# Patient Record
Sex: FEMALE | Race: BLACK OR AFRICAN AMERICAN | NOT HISPANIC OR LATINO
[De-identification: names, ages, dates, MRNs, and addresses within clinical notes are randomized per-mention and may not be internally consistent; named-entity substitution may affect disease eponyms.]

---

## 2017-06-23 ENCOUNTER — RESULT REVIEW (OUTPATIENT)
Age: 22
End: 2017-06-23

## 2017-06-23 ENCOUNTER — INPATIENT (INPATIENT)
Facility: HOSPITAL | Age: 22
LOS: 2 days | Discharge: HOME | End: 2017-06-26
Attending: SURGERY | Admitting: SURGERY
Payer: COMMERCIAL

## 2017-06-23 ENCOUNTER — TRANSCRIPTION ENCOUNTER (OUTPATIENT)
Age: 22
End: 2017-06-23

## 2017-06-23 VITALS
RESPIRATION RATE: 18 BRPM | HEIGHT: 66 IN | WEIGHT: 154.98 LBS | TEMPERATURE: 98 F | HEART RATE: 82 BPM | SYSTOLIC BLOOD PRESSURE: 133 MMHG | OXYGEN SATURATION: 99 % | DIASTOLIC BLOOD PRESSURE: 70 MMHG

## 2017-06-23 DIAGNOSIS — K35.3 ACUTE APPENDICITIS WITH LOCALIZED PERITONITIS: ICD-10-CM

## 2017-06-23 LAB
ALBUMIN SERPL ELPH-MCNC: 4 G/DL — SIGNIFICANT CHANGE UP (ref 3.3–5)
ALP SERPL-CCNC: 45 U/L — SIGNIFICANT CHANGE UP (ref 40–120)
ALT FLD-CCNC: 18 U/L — SIGNIFICANT CHANGE UP (ref 12–78)
ANION GAP SERPL CALC-SCNC: 7 MMOL/L — SIGNIFICANT CHANGE UP (ref 5–17)
APPEARANCE UR: CLEAR — SIGNIFICANT CHANGE UP
APTT BLD: 30.1 SEC — SIGNIFICANT CHANGE UP (ref 27.5–37.4)
AST SERPL-CCNC: 11 U/L — LOW (ref 15–37)
BACTERIA # UR AUTO: ABNORMAL
BASOPHILS # BLD AUTO: 0.1 K/UL — SIGNIFICANT CHANGE UP (ref 0–0.2)
BASOPHILS NFR BLD AUTO: 0.9 % — SIGNIFICANT CHANGE UP (ref 0–2)
BILIRUB SERPL-MCNC: 0.5 MG/DL — SIGNIFICANT CHANGE UP (ref 0.2–1.2)
BILIRUB UR-MCNC: NEGATIVE — SIGNIFICANT CHANGE UP
BLD GP AB SCN SERPL QL: SIGNIFICANT CHANGE UP
BUN SERPL-MCNC: 10 MG/DL — SIGNIFICANT CHANGE UP (ref 7–23)
CALCIUM SERPL-MCNC: 8.6 MG/DL — SIGNIFICANT CHANGE UP (ref 8.5–10.1)
CHLORIDE SERPL-SCNC: 108 MMOL/L — SIGNIFICANT CHANGE UP (ref 96–108)
CO2 SERPL-SCNC: 26 MMOL/L — SIGNIFICANT CHANGE UP (ref 22–31)
COLOR SPEC: YELLOW — SIGNIFICANT CHANGE UP
CREAT SERPL-MCNC: 0.85 MG/DL — SIGNIFICANT CHANGE UP (ref 0.5–1.3)
DIFF PNL FLD: ABNORMAL
EOSINOPHIL # BLD AUTO: 0.1 K/UL — SIGNIFICANT CHANGE UP (ref 0–0.5)
EOSINOPHIL NFR BLD AUTO: 1 % — SIGNIFICANT CHANGE UP (ref 0–6)
EPI CELLS # UR: SIGNIFICANT CHANGE UP
GLUCOSE SERPL-MCNC: 89 MG/DL — SIGNIFICANT CHANGE UP (ref 70–99)
GLUCOSE UR QL: NEGATIVE MG/DL — SIGNIFICANT CHANGE UP
HCG SERPL-ACNC: <1 MIU/ML — SIGNIFICANT CHANGE UP
HCT VFR BLD CALC: 35.6 % — SIGNIFICANT CHANGE UP (ref 34.5–45)
HGB BLD-MCNC: 11.9 G/DL — SIGNIFICANT CHANGE UP (ref 11.5–15.5)
INR BLD: 1.02 RATIO — SIGNIFICANT CHANGE UP (ref 0.88–1.16)
KETONES UR-MCNC: NEGATIVE — SIGNIFICANT CHANGE UP
LACTATE SERPL-SCNC: 1 MMOL/L — SIGNIFICANT CHANGE UP (ref 0.7–2)
LEUKOCYTE ESTERASE UR-ACNC: NEGATIVE — SIGNIFICANT CHANGE UP
LIDOCAIN IGE QN: 98 U/L — SIGNIFICANT CHANGE UP (ref 73–393)
LYMPHOCYTES # BLD AUTO: 1.5 K/UL — SIGNIFICANT CHANGE UP (ref 1–3.3)
LYMPHOCYTES # BLD AUTO: 14.3 % — SIGNIFICANT CHANGE UP (ref 13–44)
MCHC RBC-ENTMCNC: 25.4 PG — LOW (ref 27–34)
MCHC RBC-ENTMCNC: 33.5 GM/DL — SIGNIFICANT CHANGE UP (ref 32–36)
MCV RBC AUTO: 75.7 FL — LOW (ref 80–100)
MONOCYTES # BLD AUTO: 1.1 K/UL — HIGH (ref 0–0.9)
MONOCYTES NFR BLD AUTO: 11.2 % — SIGNIFICANT CHANGE UP (ref 2–14)
NEUTROPHILS # BLD AUTO: 7.5 K/UL — HIGH (ref 1.8–7.4)
NEUTROPHILS NFR BLD AUTO: 72.6 % — SIGNIFICANT CHANGE UP (ref 43–77)
NITRITE UR-MCNC: NEGATIVE — SIGNIFICANT CHANGE UP
PH UR: 7 — SIGNIFICANT CHANGE UP (ref 5–8)
PLATELET # BLD AUTO: 249 K/UL — SIGNIFICANT CHANGE UP (ref 150–400)
POTASSIUM SERPL-MCNC: 3.8 MMOL/L — SIGNIFICANT CHANGE UP (ref 3.5–5.3)
POTASSIUM SERPL-SCNC: 3.8 MMOL/L — SIGNIFICANT CHANGE UP (ref 3.5–5.3)
PROT SERPL-MCNC: 7.6 GM/DL — SIGNIFICANT CHANGE UP (ref 6–8.3)
PROT UR-MCNC: NEGATIVE MG/DL — SIGNIFICANT CHANGE UP
PROTHROM AB SERPL-ACNC: 11.1 SEC — SIGNIFICANT CHANGE UP (ref 9.8–12.7)
RBC # BLD: 4.7 M/UL — SIGNIFICANT CHANGE UP (ref 3.8–5.2)
RBC # FLD: 16 % — HIGH (ref 11–15)
RBC CASTS # UR COMP ASSIST: ABNORMAL /HPF (ref 0–4)
SODIUM SERPL-SCNC: 141 MMOL/L — SIGNIFICANT CHANGE UP (ref 135–145)
SP GR SPEC: 1.01 — SIGNIFICANT CHANGE UP (ref 1.01–1.02)
UROBILINOGEN FLD QL: NEGATIVE MG/DL — SIGNIFICANT CHANGE UP
WBC # BLD: 10.3 K/UL — SIGNIFICANT CHANGE UP (ref 3.8–10.5)
WBC # FLD AUTO: 10.3 K/UL — SIGNIFICANT CHANGE UP (ref 3.8–10.5)
WBC UR QL: SIGNIFICANT CHANGE UP

## 2017-06-23 PROCEDURE — 76856 US EXAM PELVIC COMPLETE: CPT | Mod: 26

## 2017-06-23 PROCEDURE — 74177 CT ABD & PELVIS W/CONTRAST: CPT | Mod: 26

## 2017-06-23 PROCEDURE — 88304 TISSUE EXAM BY PATHOLOGIST: CPT | Mod: 26

## 2017-06-23 PROCEDURE — 99285 EMERGENCY DEPT VISIT HI MDM: CPT | Mod: 25

## 2017-06-23 PROCEDURE — 44970 LAPAROSCOPY APPENDECTOMY: CPT | Mod: AS

## 2017-06-23 PROCEDURE — 71010: CPT | Mod: 26

## 2017-06-23 RX ORDER — SODIUM CHLORIDE 9 MG/ML
1000 INJECTION, SOLUTION INTRAVENOUS
Qty: 0 | Refills: 0 | Status: DISCONTINUED | OUTPATIENT
Start: 2017-06-23 | End: 2017-06-25

## 2017-06-23 RX ORDER — ONDANSETRON 8 MG/1
4 TABLET, FILM COATED ORAL ONCE
Qty: 0 | Refills: 0 | Status: COMPLETED | OUTPATIENT
Start: 2017-06-23 | End: 2017-06-23

## 2017-06-23 RX ORDER — FENTANYL CITRATE 50 UG/ML
50 INJECTION INTRAVENOUS
Qty: 0 | Refills: 0 | Status: DISCONTINUED | OUTPATIENT
Start: 2017-06-23 | End: 2017-06-23

## 2017-06-23 RX ORDER — SODIUM CHLORIDE 9 MG/ML
1000 INJECTION, SOLUTION INTRAVENOUS
Qty: 0 | Refills: 0 | Status: DISCONTINUED | OUTPATIENT
Start: 2017-06-23 | End: 2017-06-23

## 2017-06-23 RX ORDER — ACETAMINOPHEN 500 MG
1000 TABLET ORAL ONCE
Qty: 0 | Refills: 0 | Status: COMPLETED | OUTPATIENT
Start: 2017-06-23 | End: 2017-06-23

## 2017-06-23 RX ORDER — PIPERACILLIN AND TAZOBACTAM 4; .5 G/20ML; G/20ML
3.38 INJECTION, POWDER, LYOPHILIZED, FOR SOLUTION INTRAVENOUS ONCE
Qty: 0 | Refills: 0 | Status: COMPLETED | OUTPATIENT
Start: 2017-06-23 | End: 2017-06-23

## 2017-06-23 RX ORDER — PIPERACILLIN AND TAZOBACTAM 4; .5 G/20ML; G/20ML
3.38 INJECTION, POWDER, LYOPHILIZED, FOR SOLUTION INTRAVENOUS EVERY 8 HOURS
Qty: 0 | Refills: 0 | Status: DISCONTINUED | OUTPATIENT
Start: 2017-06-23 | End: 2017-06-23

## 2017-06-23 RX ORDER — HYDROMORPHONE HYDROCHLORIDE 2 MG/ML
1 INJECTION INTRAMUSCULAR; INTRAVENOUS; SUBCUTANEOUS EVERY 4 HOURS
Qty: 0 | Refills: 0 | Status: DISCONTINUED | OUTPATIENT
Start: 2017-06-23 | End: 2017-06-24

## 2017-06-23 RX ORDER — IOHEXOL 300 MG/ML
30 INJECTION, SOLUTION INTRAVENOUS ONCE
Qty: 0 | Refills: 0 | Status: COMPLETED | OUTPATIENT
Start: 2017-06-23 | End: 2017-06-23

## 2017-06-23 RX ORDER — HYDROMORPHONE HYDROCHLORIDE 2 MG/ML
1 INJECTION INTRAMUSCULAR; INTRAVENOUS; SUBCUTANEOUS
Qty: 0 | Refills: 0 | Status: DISCONTINUED | OUTPATIENT
Start: 2017-06-23 | End: 2017-06-23

## 2017-06-23 RX ORDER — SODIUM CHLORIDE 9 MG/ML
2000 INJECTION INTRAMUSCULAR; INTRAVENOUS; SUBCUTANEOUS ONCE
Qty: 0 | Refills: 0 | Status: COMPLETED | OUTPATIENT
Start: 2017-06-23 | End: 2017-06-23

## 2017-06-23 RX ORDER — SODIUM CHLORIDE 9 MG/ML
3 INJECTION INTRAMUSCULAR; INTRAVENOUS; SUBCUTANEOUS ONCE
Qty: 0 | Refills: 0 | Status: COMPLETED | OUTPATIENT
Start: 2017-06-23 | End: 2017-06-23

## 2017-06-23 RX ORDER — MORPHINE SULFATE 50 MG/1
4 CAPSULE, EXTENDED RELEASE ORAL ONCE
Qty: 0 | Refills: 0 | Status: DISCONTINUED | OUTPATIENT
Start: 2017-06-23 | End: 2017-06-23

## 2017-06-23 RX ORDER — KETOROLAC TROMETHAMINE 30 MG/ML
30 SYRINGE (ML) INJECTION ONCE
Qty: 0 | Refills: 0 | Status: DISCONTINUED | OUTPATIENT
Start: 2017-06-23 | End: 2017-06-23

## 2017-06-23 RX ORDER — MORPHINE SULFATE 50 MG/1
4 CAPSULE, EXTENDED RELEASE ORAL EVERY 4 HOURS
Qty: 0 | Refills: 0 | Status: DISCONTINUED | OUTPATIENT
Start: 2017-06-23 | End: 2017-06-23

## 2017-06-23 RX ORDER — SODIUM CHLORIDE 9 MG/ML
1000 INJECTION INTRAMUSCULAR; INTRAVENOUS; SUBCUTANEOUS ONCE
Qty: 0 | Refills: 0 | Status: COMPLETED | OUTPATIENT
Start: 2017-06-23 | End: 2017-06-23

## 2017-06-23 RX ORDER — PANTOPRAZOLE SODIUM 20 MG/1
40 TABLET, DELAYED RELEASE ORAL ONCE
Qty: 0 | Refills: 0 | Status: COMPLETED | OUTPATIENT
Start: 2017-06-23 | End: 2017-06-23

## 2017-06-23 RX ORDER — ONDANSETRON 8 MG/1
8 TABLET, FILM COATED ORAL EVERY 6 HOURS
Qty: 0 | Refills: 0 | Status: DISCONTINUED | OUTPATIENT
Start: 2017-06-23 | End: 2017-06-26

## 2017-06-23 RX ORDER — DOCUSATE SODIUM 100 MG
100 CAPSULE ORAL
Qty: 0 | Refills: 0 | Status: DISCONTINUED | OUTPATIENT
Start: 2017-06-23 | End: 2017-06-26

## 2017-06-23 RX ORDER — HEPARIN SODIUM 5000 [USP'U]/ML
5000 INJECTION INTRAVENOUS; SUBCUTANEOUS EVERY 12 HOURS
Qty: 0 | Refills: 0 | Status: DISCONTINUED | OUTPATIENT
Start: 2017-06-23 | End: 2017-06-26

## 2017-06-23 RX ORDER — HEPARIN SODIUM 5000 [USP'U]/ML
5000 INJECTION INTRAVENOUS; SUBCUTANEOUS EVERY 12 HOURS
Qty: 0 | Refills: 0 | Status: DISCONTINUED | OUTPATIENT
Start: 2017-06-23 | End: 2017-06-23

## 2017-06-23 RX ORDER — ONDANSETRON 8 MG/1
4 TABLET, FILM COATED ORAL EVERY 6 HOURS
Qty: 0 | Refills: 0 | Status: DISCONTINUED | OUTPATIENT
Start: 2017-06-23 | End: 2017-06-23

## 2017-06-23 RX ADMIN — HYDROMORPHONE HYDROCHLORIDE 1 MILLIGRAM(S): 2 INJECTION INTRAMUSCULAR; INTRAVENOUS; SUBCUTANEOUS at 23:25

## 2017-06-23 RX ADMIN — PANTOPRAZOLE SODIUM 40 MILLIGRAM(S): 20 TABLET, DELAYED RELEASE ORAL at 07:04

## 2017-06-23 RX ADMIN — SODIUM CHLORIDE 125 MILLILITER(S): 9 INJECTION, SOLUTION INTRAVENOUS at 20:03

## 2017-06-23 RX ADMIN — SODIUM CHLORIDE 3 MILLILITER(S): 9 INJECTION INTRAMUSCULAR; INTRAVENOUS; SUBCUTANEOUS at 06:49

## 2017-06-23 RX ADMIN — SODIUM CHLORIDE 1000 MILLILITER(S): 9 INJECTION INTRAMUSCULAR; INTRAVENOUS; SUBCUTANEOUS at 12:41

## 2017-06-23 RX ADMIN — IOHEXOL 30 MILLILITER(S): 300 INJECTION, SOLUTION INTRAVENOUS at 07:04

## 2017-06-23 RX ADMIN — PIPERACILLIN AND TAZOBACTAM 200 GRAM(S): 4; .5 INJECTION, POWDER, LYOPHILIZED, FOR SOLUTION INTRAVENOUS at 12:38

## 2017-06-23 RX ADMIN — Medication 30 MILLIGRAM(S): at 10:17

## 2017-06-23 RX ADMIN — HYDROMORPHONE HYDROCHLORIDE 1 MILLIGRAM(S): 2 INJECTION INTRAMUSCULAR; INTRAVENOUS; SUBCUTANEOUS at 23:10

## 2017-06-23 RX ADMIN — Medication 1000 MILLIGRAM(S): at 18:13

## 2017-06-23 RX ADMIN — SODIUM CHLORIDE 2000 MILLILITER(S): 9 INJECTION INTRAMUSCULAR; INTRAVENOUS; SUBCUTANEOUS at 06:49

## 2017-06-23 RX ADMIN — Medication 400 MILLIGRAM(S): at 17:25

## 2017-06-23 RX ADMIN — MORPHINE SULFATE 4 MILLIGRAM(S): 50 CAPSULE, EXTENDED RELEASE ORAL at 07:41

## 2017-06-23 RX ADMIN — ONDANSETRON 4 MILLIGRAM(S): 8 TABLET, FILM COATED ORAL at 07:04

## 2017-06-23 RX ADMIN — ONDANSETRON 8 MILLIGRAM(S): 8 TABLET, FILM COATED ORAL at 19:52

## 2017-06-23 RX ADMIN — MORPHINE SULFATE 4 MILLIGRAM(S): 50 CAPSULE, EXTENDED RELEASE ORAL at 08:53

## 2017-06-23 RX ADMIN — SODIUM CHLORIDE 100 MILLILITER(S): 9 INJECTION, SOLUTION INTRAVENOUS at 17:05

## 2017-06-23 RX ADMIN — Medication 30 MILLIGRAM(S): at 11:17

## 2017-06-23 NOTE — ED PROVIDER NOTE - PROGRESS NOTE DETAILS
surgery is paged Pt sts she had right lower abd pain since 19:00 pm yesterday. Dr. Moon surgeon is here eval and admit pt.

## 2017-06-23 NOTE — ED PROVIDER NOTE - PHYSICAL EXAMINATION
General:     NAD, well-nourished, well-appearing  Head:     NC/AT, EOMI, oral mucosa moist  Neck:     trachea midline  Lungs:     CTA b/l, no w/r/r  CVS:     S1S2, RRR, no m/g/r  Abd:     +BS, ttp @ RLQ, +Rovsing, +rebound, s/nd, no organomegaly  Ext:    2+ radial and pedal pulses, no c/c/e  Neuro: AAOx3, no sensory/motor deficits

## 2017-06-23 NOTE — H&P ADULT - NSHPPHYSICALEXAM_GEN_ALL_CORE
PHYSICAL EXAM:  GENERAL: NAD, well-groomed, well-developed  HEAD:  Atraumatic, Normocephalic  EYES: EOMI, PERRLA, conjunctiva and sclera clear  ENMT: No tonsillar erythema, exudates, or enlargement; Moist mucous membranes,   NECK: Supple, No JVD, Normal thyroid  HEART: Regular rate and rhythm; No murmurs, rubs, or gallops  RESPIRATORY: CTA B/L, No W/R/R  ABDOMEN: Soft, Nondistended; Bowel sounds present, +Mcburney point tenderness, +rebound, +rovsing, mild guarding  NEUROLOGY: A&Ox3, nonfocal, CN II-XII grossly intact, sensation intact, no gait abnormalities bilaterally;  EXTREMITIES:  2+ Peripheral Pulses, No clubbing, cyanosis, or edema  SKIN: warm, dry, normal color, no rash or abnormal lesions

## 2017-06-23 NOTE — H&P ADULT - NSHPREVIEWOFSYSTEMS_GEN_ALL_CORE
REVIEW OF SYSTEM:  CONSTITUTIONAL: No weakness, fevers or chills  EYES/ENT: No visual changes;  No vertigo or throat pain   NECK: No pain or stiffness  RESPIRATORY: No cough, wheezing, hemoptysis; No shortness of breath  CARDIOVASCULAR: No chest pain or palpitations  GASTROINTESTINAL:  +abdominal pain, +N/V  No hematemesis; No diarrhea or constipation. No melena or hematochezia.  GENITOURINARY: No dysuria, frequency or hematuria  NEUROLOGICAL: No numbness or weakness  MUSCULOSKELETAL: +left leg and ankle pain  SKIN: No itching, burning, rashes, or lesions   All other review of systems is negative unless indicated above.

## 2017-06-23 NOTE — H&P ADULT - ATTENDING COMMENTS
I have seen and examined the patient.  I have reviewed the CT images and all lab work.  I have discussed the need for an appendectomy with the patient and her mother.  I have gone over the procedure of a laparoscopic appendectomy and possible open appendectomy with the patient and her mother.  The patient has signed the consent for surgery.

## 2017-06-23 NOTE — H&P ADULT - HISTORY OF PRESENT ILLNESS
21y/o female with no significant PMH present with c/o RLQ pain. Pt states that she started her menses on Wednesday and had cramps, however she began to have severe sharp RLQ pain yesterday evening, non-radiating Pain was associated with nausea, vomiting X 3 episodes and loss of appetite. Denies fever/chills, denies diarrhea, dysuria or frequency. Denies CP/SOB. Last meal was 6pm yesterday, last BM was yesterday morning, denies any change in bowel habits.

## 2017-06-23 NOTE — ED PROVIDER NOTE - SHIFT CHANGE DETAILS
ct, us, dispo  Patient signed out to incoming physician.  All decisions regarding the progression of care will be made at their discretion.

## 2017-06-23 NOTE — H&P ADULT - NSHPSOCIALHISTORY_GEN_ALL_CORE
drinks socially, nonsmoker, denies any other recreational drugs, lives with mother, works at [employer].

## 2017-06-23 NOTE — PROGRESS NOTE ADULT - SUBJECTIVE AND OBJECTIVE BOX
Post-op Check~ S/P Laparoscopic appendectomy POD# 0    22y Female seen and examined at bedside.  She c/o nausea and while seeing the pt, she vomited approximately 50ml of water/mucous.  After emesis pt stated she felt better.    Vital Signs Last 24 Hrs  T(F): 97.5, Max: 98.8 (06-23 @ 16:43)  HR: 72  BP: 105/69  RR: 16  SpO2: 97%  Wt(kg): --   CAPILLARY BLOOD GLUCOSE    I&O's Detail    I & Os for current day (as of 23 Jun 2017 23:43)  =============================================  IN:    lactated ringers.: 225 ml    Total IN: 225 ml  ---------------------------------------------  OUT:    Total OUT: 0 ml  ---------------------------------------------  Total NET: 225 ml      GEN:  NAD, appears uncomfortable   RESP: No SOB, respirations nonlabored, CTAB  CV:  RRR S1 S2, no tachycardia  ABD: soft, nondistended, with expected surgical site tenderness, no guarding or rigidity, steri-strips intact without evidence of citlali bleeding   EXT:  warm well perfused, no calf tenderness, no edema.     A/P 22yFemale S/P Laparoscopic appendectomy POD#0, with nausea and emesis x once.    -anti-emetics  -pain control prn  -f/u AM labs, replete prn  -DVT prophylaxis   -encourage I/S, education provided  -OOB

## 2017-06-23 NOTE — ED PROVIDER NOTE - OBJECTIVE STATEMENT
22yoF; with no signif pmh; now p/w abd pain--epigastric, radiating to rlq, x1 day, progressively worsening, associated with n/v. c/o chills. denies fever. denies sick contacts. denies travel. denies unusual PO intake.

## 2017-06-23 NOTE — H&P ADULT - NSHPLABSRESULTS_GEN_ALL_CORE
06-23    141  |  108  |  10  ----------------------------<  89  3.8   |  26  |  0.85    Ca    8.6      23 Jun 2017 06:54    TPro  7.6  /  Alb  4.0  /  TBili  0.5  /  DBili  x   /  AST  11<L>  /  ALT  18  /  AlkPhos  45  06-23                        11.9   10.3  )-----------( 249      ( 23 Jun 2017 06:54 )             35.6  Prothrombin Time and INR, Plasma (06.23.17 @ 06:54)    Prothrombin Time, Plasma: 11.1: Effective March 21st, the reference range for PT has changed. sec    INR: 1.02: Please note: New Critical Value: 5.0 ratio as of 1/2/14. ratio    CXR:  FINDINGS:  No previous examinations are available for review.    The lungs are clear.  No pleural abnormality is seen.      The heart and mediastinum appear intact.           IMPRESSION: No evidence of active chest disease.            EXAM:  CT ABDOMEN AND PELVIS OC IC                            PROCEDURE DATE:  06/23/2017        INTERPRETATION:  CLINICAL INFORMATION:  Right lower quadrant abdominal   pain    COMPARISON: 12/27/2015    PROCEDURE:     Serial axial sections throughthe abdomen and pelvis are obtained with   the use of oral and intravenous contrast from the diaphragms to the   symphysis pubis.  3-D coronal and sagittal reformations were then created   from the axial images.    100 mls of Omnipaque 350 was administered intravenously without   complication and 0 mls were discarded.    FINDINGS:    LOWER CHEST: Within normal limits.    ABDOMEN:  LIVER: 9 mm indeterminate hypodense right hepatic lesion, stable in size.  BILE DUCTS: Normal caliber.  GALLBLADDER: No calcified gallstones. Normal caliber wall.  PANCREAS: Within normal limits.  SPLEEN: Within normal limits. 1.1 cm splenule.  ADRENALS: Within normal limits.  KIDNEYS/URETERS: Within normal limits. No hydronephrosis.    PELVIS: Small amount of pelvic fluid.  REPRODUCTIVE ORGANS: The uterus and adnexa are within normal limits.  URINARY BLADDER: Within normal limits.    BOWEL: Normal caliber.  APPENDIX: Mid and distal appendix is fluid-filled and dilated up to 1.1   cm. Periappendiceal inflammatory changes and fluid.  PERITONEUM: Mildly enlarged mesenteric lymph nodes. No free air. No fluid   collection.  VESSELS: Within normal limits.   RETROPERITONEUM: Within normal limits.  ABDOMINAL WALL: Fat-containing umbilical hernia.  BONES: Within normal limits.    IMPRESSION:      Acute appendicitis.  Mild mesenteric adenopathy.

## 2017-06-24 LAB
ANION GAP SERPL CALC-SCNC: 6 MMOL/L — SIGNIFICANT CHANGE UP (ref 5–17)
BUN SERPL-MCNC: 6 MG/DL — LOW (ref 7–23)
CALCIUM SERPL-MCNC: 7.9 MG/DL — LOW (ref 8.5–10.1)
CHLORIDE SERPL-SCNC: 107 MMOL/L — SIGNIFICANT CHANGE UP (ref 96–108)
CO2 SERPL-SCNC: 28 MMOL/L — SIGNIFICANT CHANGE UP (ref 22–31)
CREAT SERPL-MCNC: 0.96 MG/DL — SIGNIFICANT CHANGE UP (ref 0.5–1.3)
GLUCOSE SERPL-MCNC: 83 MG/DL — SIGNIFICANT CHANGE UP (ref 70–99)
HCT VFR BLD CALC: 32.3 % — LOW (ref 34.5–45)
HGB BLD-MCNC: 10.9 G/DL — LOW (ref 11.5–15.5)
MCHC RBC-ENTMCNC: 26 PG — LOW (ref 27–34)
MCHC RBC-ENTMCNC: 33.6 GM/DL — SIGNIFICANT CHANGE UP (ref 32–36)
MCV RBC AUTO: 77.2 FL — LOW (ref 80–100)
PLATELET # BLD AUTO: 195 K/UL — SIGNIFICANT CHANGE UP (ref 150–400)
POTASSIUM SERPL-MCNC: 3.6 MMOL/L — SIGNIFICANT CHANGE UP (ref 3.5–5.3)
POTASSIUM SERPL-SCNC: 3.6 MMOL/L — SIGNIFICANT CHANGE UP (ref 3.5–5.3)
RBC # BLD: 4.19 M/UL — SIGNIFICANT CHANGE UP (ref 3.8–5.2)
RBC # FLD: 15.7 % — HIGH (ref 11–15)
SODIUM SERPL-SCNC: 141 MMOL/L — SIGNIFICANT CHANGE UP (ref 135–145)
WBC # BLD: 5.9 K/UL — SIGNIFICANT CHANGE UP (ref 3.8–10.5)
WBC # FLD AUTO: 5.9 K/UL — SIGNIFICANT CHANGE UP (ref 3.8–10.5)

## 2017-06-24 RX ORDER — ACETAMINOPHEN 500 MG
1000 TABLET ORAL ONCE
Qty: 0 | Refills: 0 | Status: COMPLETED | OUTPATIENT
Start: 2017-06-24 | End: 2017-06-24

## 2017-06-24 RX ORDER — OXYCODONE HYDROCHLORIDE 5 MG/1
2 TABLET ORAL
Qty: 24 | Refills: 0
Start: 2017-06-24

## 2017-06-24 RX ORDER — PANTOPRAZOLE SODIUM 20 MG/1
40 TABLET, DELAYED RELEASE ORAL ONCE
Qty: 0 | Refills: 0 | Status: COMPLETED | OUTPATIENT
Start: 2017-06-24 | End: 2017-06-24

## 2017-06-24 RX ADMIN — HEPARIN SODIUM 5000 UNIT(S): 5000 INJECTION INTRAVENOUS; SUBCUTANEOUS at 05:42

## 2017-06-24 RX ADMIN — Medication 400 MILLIGRAM(S): at 19:08

## 2017-06-24 RX ADMIN — Medication 202 MILLIGRAM(S): at 00:26

## 2017-06-24 RX ADMIN — Medication 100 MILLIGRAM(S): at 17:34

## 2017-06-24 RX ADMIN — PANTOPRAZOLE SODIUM 40 MILLIGRAM(S): 20 TABLET, DELAYED RELEASE ORAL at 14:32

## 2017-06-24 RX ADMIN — Medication 100 MILLIGRAM(S): at 05:42

## 2017-06-24 RX ADMIN — HEPARIN SODIUM 5000 UNIT(S): 5000 INJECTION INTRAVENOUS; SUBCUTANEOUS at 17:34

## 2017-06-24 NOTE — DISCHARGE NOTE ADULT - HOSPITAL COURSE
Pt presented to ER with abdominal pain and found to have acute appendicitis. Pt was taken to OR for Laparoscopic Appendectomy. POD#1 pt is tolerating regular diet, abdominal pain is adequately controlled with pain meds, voids and ambulate without issues. Pt is stable for discharge with out patient follow-up. Pt presented to ER with abdominal pain and found to have acute appendicitis. Pt was taken to OR for Laparoscopic Appendectomy. POD#1 pt is tolerating regular diet, abdominal pain is adequately controlled with pain meds, voids and ambulate without issues. The Pt was not discharged because of a low grade fever On POD # 1&2.. On POD #3, she was afebrile & cleared for discharge..    To f/u in office next week.

## 2017-06-24 NOTE — DISCHARGE NOTE ADULT - MEDICATION SUMMARY - MEDICATIONS TO TAKE
I will START or STAY ON the medications listed below when I get home from the hospital:    acetaminophen-oxycodone 325 mg-5 mg oral tablet  -- 2 tab(s) by mouth every 6 hours, As Needed, Severe Pain (7 - 10) MDD:8  -- Indication: For ACUTE APPENENDICITIS I will START or STAY ON the medications listed below when I get home from the hospital:    acetaminophen-oxycodone 325 mg-5 mg oral tablet  -- 2 tab(s) by mouth every 6 hours, As Needed, Severe Pain (7 - 10) MDD:8  -- Indication: For moderate pain    Motrin  mg oral capsule  -- 2 cap(s) by mouth every 6 hours, As Needed - for mild pain; take with food  -- Indication: For mild pain    docusate sodium 100 mg oral capsule  -- 1 cap(s) by mouth 2 times a day  -- Indication: For stool softener

## 2017-06-24 NOTE — DISCHARGE NOTE ADULT - CARE PLAN
Principal Discharge DX:	Acute appendicitis with localized peritonitis  Goal:	Full recovery, tolerate regular diet, pain control  Instructions for follow-up, activity and diet:	Please no heavy lifting  bending twisting pushing or  pulling , may take shower, allow water to run over incision site, do not rub or peel tape-like strips, they will fall off on their own, no work or driving until follow-up appt. NO antiinflammatory medications(like aspirin/Advil/naprosyn).Take OTC stool softeners as needed for constipation. Diet as tolerated. Call the doctor if any questions or concerns.

## 2017-06-24 NOTE — DISCHARGE NOTE ADULT - PLAN OF CARE
Full recovery, tolerate regular diet, pain control Please no heavy lifting  bending twisting pushing or  pulling , may take shower, allow water to run over incision site, do not rub or peel tape-like strips, they will fall off on their own, no work or driving until follow-up appt. NO antiinflammatory medications(like aspirin/Advil/naprosyn).Take OTC stool softeners as needed for constipation. Diet as tolerated. Call the doctor if any questions or concerns.

## 2017-06-24 NOTE — DISCHARGE NOTE ADULT - CARE PROVIDER_API CALL
Vidal Mcdonald), Surgery  [address] [place] [address]  Phone: [phone]  Fax: [phone]

## 2017-06-24 NOTE — PROGRESS NOTE ADULT - SUBJECTIVE AND OBJECTIVE BOX
Patient seen/examined.  Agree w above note and plan and have discussed plan w house staff. C/o pain.   Home later if improved    Ted De Guzman MD

## 2017-06-24 NOTE — CHART NOTE - NSCHARTNOTEFT_GEN_A_CORE
Called by RN, pt was for discharge but now has a temperature of 101. D/C cancelled, Tylenol ordered and pt instructed to use incentive spirometry and to ambulate.

## 2017-06-24 NOTE — PROGRESS NOTE ADULT - SUBJECTIVE AND OBJECTIVE BOX
Postoperative Day #:1  Patient seen and examined bedside resting comfortably, had episode of nausea and vomiting last night after pain meds were taken.  Abdominal pain is well controlled.Tolerating diet.  Denies chest pain, dyspnea, cough. Voids    T(F): 98, Max: 98.8 (06-23 @ 16:43)  HR: 74 (62 - 86)  BP: 108/60 (100/67 - 119/70)  RR: 16 (11 - 17)  SpO2: 98% (97% - 100%)  Wt(kg): --  CAPILLARY BLOOD GLUCOSE      PHYSICAL EXAM:  General: NAD, WDWN  Neuro:  Alert & oriented x 3  HEENT: NCAT, EOMI, conjunctiva clear  CV: +S1+S2 regular rate and rhythm  Lung: clear to ausculation bilaterally, respirations nonlabored, good inspiratory effort  Abdomen: soft, ND. Normoactive BS, incision with steri strips c/d/i, no active bleeding, +incisional TTP  Extremities: no pedal edema or calf tenderness noted     LABS:                        11.9   10.3  )-----------( 249      ( 23 Jun 2017 06:54 )             35.6     06-23    141  |  108  |  10  ----------------------------<  89  3.8   |  26  |  0.85    Ca    8.6      23 Jun 2017 06:54    TPro  7.6  /  Alb  4.0  /  TBili  0.5  /  DBili  x   /  AST  11<L>  /  ALT  18  /  AlkPhos  45  06-23    PT/INR - ( 23 Jun 2017 06:54 )   PT: 11.1 sec;   INR: 1.02 ratio         PTT - ( 23 Jun 2017 06:54 )  PTT:30.1 sec  I&O's Detail    I & Os for current day (as of 24 Jun 2017 08:49)  =============================================  IN:    lactated ringers.: 1200 ml    lactated ringers.: 225 ml    Solution: 50 ml    Total IN: 1475 ml  ---------------------------------------------  OUT:    Voided: 300 ml    Total OUT: 300 ml  ---------------------------------------------  Total NET: 1175 ml        Impression: 22y Female S/P Lap Appendectomy-POD#1        Plan:  -continue VTE prophylaxis  -Pain management   -Increase activity with PT, OOB, Ambulate  -educated on proper incentive spirometry use  -Discharge home today  -will discuss with surgical attending

## 2017-06-24 NOTE — DISCHARGE NOTE ADULT - PATIENT PORTAL LINK FT
“You can access the [facility] Patient Portal, offered by [facility], by registering with the following website: [email or web address]”

## 2017-06-25 LAB
BASOPHILS # BLD AUTO: 0 K/UL — SIGNIFICANT CHANGE UP (ref 0–0.2)
BASOPHILS NFR BLD AUTO: 1 % — SIGNIFICANT CHANGE UP (ref 0–2)
EOSINOPHIL # BLD AUTO: 0.1 K/UL — SIGNIFICANT CHANGE UP (ref 0–0.5)
EOSINOPHIL NFR BLD AUTO: 1.5 % — SIGNIFICANT CHANGE UP (ref 0–6)
HCT VFR BLD CALC: 30.3 % — LOW (ref 34.5–45)
HGB BLD-MCNC: 10.3 G/DL — LOW (ref 11.5–15.5)
LYMPHOCYTES # BLD AUTO: 1.6 K/UL — SIGNIFICANT CHANGE UP (ref 1–3.3)
LYMPHOCYTES # BLD AUTO: 38.1 % — SIGNIFICANT CHANGE UP (ref 13–44)
MCHC RBC-ENTMCNC: 25.3 PG — LOW (ref 27–34)
MCHC RBC-ENTMCNC: 34 GM/DL — SIGNIFICANT CHANGE UP (ref 32–36)
MCV RBC AUTO: 74.4 FL — LOW (ref 80–100)
MONOCYTES # BLD AUTO: 0.9 K/UL — SIGNIFICANT CHANGE UP (ref 0–0.9)
MONOCYTES NFR BLD AUTO: 20.5 % — HIGH (ref 2–14)
NEUTROPHILS # BLD AUTO: 1.6 K/UL — LOW (ref 1.8–7.4)
NEUTROPHILS NFR BLD AUTO: 38.9 % — LOW (ref 43–77)
PLATELET # BLD AUTO: 197 K/UL — SIGNIFICANT CHANGE UP (ref 150–400)
RBC # BLD: 4.07 M/UL — SIGNIFICANT CHANGE UP (ref 3.8–5.2)
RBC # FLD: 15.6 % — HIGH (ref 11–15)
WBC # BLD: 4.1 K/UL — SIGNIFICANT CHANGE UP (ref 3.8–10.5)
WBC # FLD AUTO: 4.1 K/UL — SIGNIFICANT CHANGE UP (ref 3.8–10.5)

## 2017-06-25 RX ORDER — IBUPROFEN 200 MG
400 TABLET ORAL ONCE
Qty: 0 | Refills: 0 | Status: COMPLETED | OUTPATIENT
Start: 2017-06-25 | End: 2017-06-25

## 2017-06-25 RX ORDER — ACETAMINOPHEN 500 MG
650 TABLET ORAL EVERY 6 HOURS
Qty: 0 | Refills: 0 | Status: DISCONTINUED | OUTPATIENT
Start: 2017-06-25 | End: 2017-06-26

## 2017-06-25 RX ADMIN — Medication 100 MILLIGRAM(S): at 06:21

## 2017-06-25 RX ADMIN — HEPARIN SODIUM 5000 UNIT(S): 5000 INJECTION INTRAVENOUS; SUBCUTANEOUS at 06:21

## 2017-06-25 RX ADMIN — Medication 400 MILLIGRAM(S): at 20:39

## 2017-06-25 RX ADMIN — Medication 400 MILLIGRAM(S): at 21:30

## 2017-06-25 NOTE — PROGRESS NOTE ADULT - ATTENDING COMMENTS
I saw and examined the pt and discussed the tx plan with the House Staff. I agree with the SPA's note from today. Appears well, denies pain, urinating well. Abd soft, min tender, ND. Incisions dry. Zheng POD#1 likely d/t atelectasis. D/c home in the pm if no issues.  Karen Addison MD

## 2017-06-26 VITALS
OXYGEN SATURATION: 100 % | TEMPERATURE: 98 F | DIASTOLIC BLOOD PRESSURE: 58 MMHG | RESPIRATION RATE: 18 BRPM | SYSTOLIC BLOOD PRESSURE: 110 MMHG | HEART RATE: 72 BPM

## 2017-06-26 LAB
HCT VFR BLD CALC: 33.9 % — LOW (ref 34.5–45)
HGB BLD-MCNC: 11.6 G/DL — SIGNIFICANT CHANGE UP (ref 11.5–15.5)
MCHC RBC-ENTMCNC: 26.5 PG — LOW (ref 27–34)
MCHC RBC-ENTMCNC: 34.3 GM/DL — SIGNIFICANT CHANGE UP (ref 32–36)
MCV RBC AUTO: 77.1 FL — LOW (ref 80–100)
PLATELET # BLD AUTO: 200 K/UL — SIGNIFICANT CHANGE UP (ref 150–400)
RBC # BLD: 4.4 M/UL — SIGNIFICANT CHANGE UP (ref 3.8–5.2)
RBC # FLD: 15.7 % — HIGH (ref 11–15)
WBC # BLD: 4.1 K/UL — SIGNIFICANT CHANGE UP (ref 3.8–10.5)
WBC # FLD AUTO: 4.1 K/UL — SIGNIFICANT CHANGE UP (ref 3.8–10.5)

## 2017-06-26 RX ORDER — DOCUSATE SODIUM 100 MG
1 CAPSULE ORAL
Qty: 0 | Refills: 0 | DISCHARGE
Start: 2017-06-26

## 2017-06-26 RX ADMIN — HEPARIN SODIUM 5000 UNIT(S): 5000 INJECTION INTRAVENOUS; SUBCUTANEOUS at 05:48

## 2017-06-26 RX ADMIN — Medication 100 MILLIGRAM(S): at 05:48

## 2017-06-26 NOTE — PROGRESS NOTE ADULT - SUBJECTIVE AND OBJECTIVE BOX
AM Labs F/U    Complete Blood Count in AM (06.26.17 @ 06:35)    WBC Count: 4.1 K/uL    RBC Count: 4.40 M/uL    Hemoglobin: 11.6 g/dL    Hematocrit: 33.9 %    Mean Cell Volume: 77.1 fl    Mean Cell Hemoglobin: 26.5 pg    Mean Cell Hemoglobin Conc: 34.3 gm/dL    Red Cell Distrib Width: 15.7 %    Platelet Count - Automated: 200 K/uL    H/H stable .    Await results of UGI series. AM Labs F/U    Complete Blood Count in AM (06.26.17 @ 06:35)    WBC Count: 4.1 K/uL    RBC Count: 4.40 M/uL    Hemoglobin: 11.6 g/dL    Hematocrit: 33.9 %    Mean Cell Volume: 77.1 fl    Mean Cell Hemoglobin: 26.5 pg    Mean Cell Hemoglobin Conc: 34.3 gm/dL    Red Cell Distrib Width: 15.7 %    Platelet Count - Automated: 200 K/uL    H/H stable   WBC stable, not elevated..    Await results of UGI series.

## 2017-06-27 LAB — SURGICAL PATHOLOGY FINAL REPORT - CH: SIGNIFICANT CHANGE UP

## 2017-06-28 DIAGNOSIS — K35.3 ACUTE APPENDICITIS WITH LOCALIZED PERITONITIS: ICD-10-CM

## 2017-07-07 ENCOUNTER — APPOINTMENT (OUTPATIENT)
Dept: SURGERY | Facility: CLINIC | Age: 22
End: 2017-07-07

## 2017-07-07 VITALS
OXYGEN SATURATION: 100 % | HEART RATE: 86 BPM | BODY MASS INDEX: 25.83 KG/M2 | SYSTOLIC BLOOD PRESSURE: 112 MMHG | TEMPERATURE: 98 F | HEIGHT: 65 IN | WEIGHT: 155 LBS | RESPIRATION RATE: 14 BRPM | DIASTOLIC BLOOD PRESSURE: 79 MMHG

## 2017-07-07 DIAGNOSIS — Z09 ENCOUNTER FOR FOLLOW-UP EXAMINATION AFTER COMPLETED TREATMENT FOR CONDITIONS OTHER THAN MALIGNANT NEOPLASM: ICD-10-CM

## 2017-07-07 DIAGNOSIS — Z78.9 OTHER SPECIFIED HEALTH STATUS: ICD-10-CM

## 2017-07-07 DIAGNOSIS — Z80.42 FAMILY HISTORY OF MALIGNANT NEOPLASM OF PROSTATE: ICD-10-CM

## 2017-07-07 DIAGNOSIS — K35.3 ACUTE APPENDICITIS WITH LOCALIZED PERITONITIS: ICD-10-CM

## 2017-07-07 DIAGNOSIS — K21.9 GASTRO-ESOPHAGEAL REFLUX DISEASE W/OUT ESOPHAGITIS: ICD-10-CM

## 2017-07-07 DIAGNOSIS — K35.80 UNSPECIFIED ACUTE APPENDICITIS: ICD-10-CM

## 2017-07-07 RX ORDER — FOLIC ACID 1 MG/1
1 TABLET ORAL
Refills: 0 | Status: ACTIVE | COMMUNITY

## 2017-09-25 NOTE — PROGRESS NOTE ADULT - SUBJECTIVE AND OBJECTIVE BOX
Chief Complaint





- Chief Complaint


Chief Complaint: Headache with right-sided weakness





History of Present Illness





- Admitted From


Admitted From:: Emergency department





- History Obtained From


Records Reviewed: Yes


History obtained from: Patient


Exam Limitations: None





- History of Present Illness


HPI Comment/Other: 





Patient is a 58-year-old -American gentleman with a past medical history 

significant for hypertension, hyperlipidemia, Obstructive sleep apnea on CPAP 

CVA in 2011 with multiple TIAs since and history of anxiety who presents to the 

emergency department with a chief complaint of headache and right-sided 

weakness.  The patient states he was in his normal state of health until around 

10 PM this evening when he states he began to notice that his vision was blurry 

and he felt as though his coordination was off.  He states he felt a severe 

headache on the right side of his head going down posteriorly.  He states that 

with that he also noticed some numbness in his right hand and noticed that it 

was weak.  He also states that his left leg was mildly weak but most of the 

weakness was in his right hand and right arm.  The patient states that he does 

use Plavix and Lipitor at home which he is compliant with.  The patient states 

that he has had episodes like this before that to resolve but given that the 

symptoms were not resolving he decided to come into the emergency department.  





Patient otherwise denies any runny nose, sore throat, nasal congestion, fevers, 

chills, shortness of breath, orthopnea, PND, increased lower extremity swelling

, chest pain, abdominal pain, nausea, vomiting, diarrhea, constipation, urinary 

urgency, frequency, dysuria, joint pains, joint swelling, muscle aches, back 

pain, neck stiffness, changes in his appetite or any recent unintentional 

weight loss.





On presentation to the emergency department the patient was afebrile and vital 

signs were within normal limits.  The patient did not appear to be in any acute 

distress but he did have evidence of right sided weakness.  The patient 

underwent a CT scan of his head which showed no acute intracranial process.  

The patient's EKG showed sinus rhythm without any atrial fibrillation.  The 

patient underwent routine lab work which was all within normal limits.  Given 

that the patient's symptoms had not resolved in the emergency department the 

patient was placed in observation for further stroke workup.





History





- Past Medical History


Cardiovascular: reports: Hypertension, High cholesterol


Respiratory: reports: Sleep apnea, CPAP use


Neuro: reports: CVA


Endocrine/Autoimmune: reports: None


GI: reports: None


: reports: Other


HEENT: reports: None


Psych: reports: Anxiety, Panic attacks


Musculoskeletal: reports: None


Derm: reports: None


MRSA Hx?: No





- Past Surgical History


Ortho: reports: ACL reconstruction, Shoulder arthroplasty, Arthroscopic surgery

, Spine surgery





- Family & Social History


Family History: Mother: Cancer (Mom had colon cancer and brother had throat 

cancer), Hypertension, Sister: Hypertension, Brother: Cancer, Hypertension


Living arrangement: At home


Living Situation: Alone


Social History Notes: The patient lives in [place].  He was born in [place] 

but grew up in [place].  He moved to [facility] in 1994 he was in the [employer].

  The patient has 3 children he is .  The patient was a former smoker 

he quit about 5 or 6 years ago he used to smoke 1 pack a day for about 20 

years.  He denies any illicit drug or alcohol use.





- Substance History


Use: Uses substance without health or social issues: NONE


Abuse: Recurrent use of substance despite neg consequences: NONE


Dependence: Experiences withdrawal or developed tolerances: NONE





- POLST


Patient has POLST: No


POLST Status: Full Code





Meds/Allgy





- Home Medications


Home Medications: 


 Ambulatory Orders











 Medication  Instructions  Recorded  Confirmed


 


Citalopram [CeleXA] 40 mg PO DAILY 03/29/14 08/30/15


 


hydroCHLOROthiazide [Hydrodiuril] 25 mg PO DAILY 03/29/14 09/25/17


 


Atorvastatin [Lipitor] 40 mg PO QPM 06/06/14 09/25/17


 


Clonazepam [Klonopin] 1 mg PO PRN PRN 06/06/14 09/25/17


 


Omega-3 Fatty Acids/Fish Oil [Fish 1,000 mg PO DAILY 06/06/14 09/25/17





Oil Softgel]   


 


Prazosin HCl 2 mg PO DAILY 06/06/14 09/25/17


 


Buspirone HCl 1 tab PO DAILY 09/25/17 09/25/17


 


Cyclosporine [Restasis] 1 drops TID 09/25/17 09/25/17














- Allergies


Allergies/Adverse Reactions: 


 Allergies











Allergy/AdvReac Type Severity Reaction Status Date / Time


 


No Known Drug Allergies Allergy   Verified 09/25/17 00:14














Review of Systems





- Other Findings


Other Findings: 





A comprehensive review of systems was performed the pertinent positives and 

negatives are stated above in the HPI and the remainder of the review of 

systems is negative.





Exam





- Vital Signs


Vital Signs: 





 Vital Signs x48h











  Temp Pulse Resp BP Pulse Ox


 


 09/25/17 00:07  36.8 C  58 L  16  102/66  98














- Physical Exam


General Appearance: positive: No acute distress, Alert


Eyes Bilateral: positive: Normal inspection, PERRL, EOMI, No lid inflammation, 

Conjunctivae nml, No scleral icterus


ENT: positive: ENT inspection nml, Pharynx nml, Dry mucous membranes.  negative

: Purulent nasal drainage, Pharyngeal erythema, Oral lesions


Neck: positive: Nml inspection, Thyroid nml, No JVD, Trachea midline.  negative

: Thyromegaly, Lymphadenopathy (R), Lymphadenopathy (L), Carotid bruit, 

Tracheal deviation


Respiratory: positive: Chest non-tender, No respiratory distress, Breath sounds 

nml.  negative: Wheezes, Rales, Rhonchi


Cardiovascular: positive: Regular rate & rhythm, No murmur, No gallop


Peripheral Pulses: positive: 2+


Abdomen: positive: Non-tender, No organomegaly, Nml bowel sounds, No 

distention.  negative: Guarding, Rebound, Hepatomegaly, Splenomegaly


Back: positive: Nml inspection.  negative: CVA tenderness (R), CVA tenderness (L

)


Skin: positive: Color nml, No rash, Warm.  negative: Cyanosis, Pallor


Extremities: positive: Non-tender, Full ROM, Nml appearance, No pedal edema.  

negative: Joint swelling


Neurologic/Psychiatric: positive: Oriented x3, CN's nml (2-12), Sensation nml, 

Mood/affect nml, Weakness (Right arm greater than right leg), Other (Ataxia).  

negative: Facial droop, Slurred/abnml speech





Conclusion/Plan





- Problem List


(1) Right sided weakness


Conclusion/Plan: 





Patient has history of CVA with residual right-sided weakness.  The patient 

presented to the emergency department with a chief complaint of headache and 

worsening right upper greater than lower extremity weakness.  The patient was 

found to have some right upper extremity weakness and mild right lower 

extremity weakness.  The patient's CT head was negative and patient's symptoms 

did not resolve while he was in the emergency department therefore he was 

placed in observation for further workup.  The patient takes Plavix and Lipitor 

at home.





Plan:


Aspirin


Plavix


Lipitor


CTA head and neck


MRI brain


Echo


Lipid profile


Tele


Neurochecks











(2) Hypertension


Conclusion/Plan: 


Hold home blood pressure medication and allow for permissive hypertension.  

Maintain systolic blood pressure less than 190 systolic and 110 diastolic


Qualifiers: 


   Hypertension type: essential hypertension   Qualified Code(s): I10 - 

Essential (primary) hypertension   





(3) Hyperlipidemia


Conclusion/Plan: 


Continue patient on Lipitor


Check lipid profile








(4) Anxiety


Conclusion/Plan: 


Continue home dose of Celexa and buspirone


Stable








(5) DVT prophylaxis


Conclusion/Plan: 


Placed on SCDs as patient presented with possible CVA therefore anticoagulation 

contraindicated.








- Lab Results


Lab results reviewed: Yes


Fish Bones: 


 09/25/17 00:55





 09/25/17 00:55


Other Lab Results: 








 Laboratory Results











WBC  6.7 x10^3/uL (4.8-10.8)   09/25/17  00:55    


 


RBC  4.92 10^6/uL (4.70-6.10)   09/25/17  00:55    


 


Hgb  14.5 g/dL (14.0-18.0)   09/25/17  00:55    


 


Hct  42.6 % (42.0-52.0)   09/25/17  00:55    


 


MCV  86.6 fL (80.0-94.0)   09/25/17  00:55    


 


MCH  29.6 pg (27.0-31.0)   09/25/17  00:55    


 


MCHC  34.1 g/dL (32.0-36.0)   09/25/17  00:55    


 


RDW  13.1 % (12.0-15.0)   09/25/17  00:55    


 


Plt Count  165 10^3/uL (130-450)   09/25/17  00:55    


 


MPV  7.9 fL (7.4-11.4)   09/25/17  00:55    


 


Neut #  3.0 10^3/uL (1.5-6.6)   09/25/17  00:55    


 


Lymph #  3.2 10^3/uL (1.5-3.5)   09/25/17  00:55    


 


Mono #  0.5 10^3/uL (0.0-1.0)   09/25/17  00:55    


 


Eos #  0.0 10^3/uL (0.0-0.7)   09/25/17  00:55    


 


Baso #  0.0 10^3/uL (0.0-0.1)   09/25/17  00:55    


 


Absolute Nucleated RBC  0.01 x10^3/uL  09/25/17  00:55    


 


Nucleated RBCs  0.1 /100WBC  09/25/17  00:55    


 


Sodium  138 mmol/L (135-145)   09/25/17  00:55    


 


Potassium  3.5 mmol/L (3.5-5.0)   09/25/17  00:55    


 


Chloride  100 mmol/L (101-111)  L  09/25/17  00:55    


 


Carbon Dioxide  28 mmol/L (21-32)   09/25/17  00:55    


 


Anion Gap  10.0  (6-13)   09/25/17  00:55    


 


BUN  11 mg/dL (6-20)   09/25/17  00:55    


 


Creatinine  1.0 mg/dL (0.6-1.2)   09/25/17  00:55    


 


Estimated GFR (MDRD)  93  (>89)   09/25/17  00:55    


 


Glucose  102 mg/dL ()  H  09/25/17  00:55    


 


Calcium  8.9 mg/dL (8.5-10.3)   09/25/17  00:55    


 


Magnesium  1.8 mg/dL (1.7-2.8)   09/25/17  00:55    


 


Total Bilirubin  0.7 mg/dL (0.2-1.0)   09/25/17  00:55    


 


AST  26 IU/L (10-42)   09/25/17  00:55    


 


ALT  25 IU/L (10-60)   09/25/17  00:55    


 


Alkaline Phosphatase  51 IU/L ()   09/25/17  00:55    


 


Total Protein  7.2 g/dL (6.7-8.2)   09/25/17  00:55    


 


Albumin  4.0 g/dL (3.2-5.5)   09/25/17  00:55    


 


Globulin  3.2 g/dL (2.1-4.2)   09/25/17  00:55    


 


Albumin/Globulin Ratio  1.3  (1.0-2.2)   09/25/17  00:55    


 


Lipase  35 U/L (22-51)   09/25/17  00:55    














- Diagnostic Imaging Results


Diagnostic Imaging Results: positive: Final report reviewed


Diagnostic Imaging Results Comments: 





CT head


Impression:


No acute intracranial process.





Issues/Core Measures





- Anticipated LOS


Anticipated Stay Length: Less than 2 midnights





- DVT/VTE - Prophylaxis


VTE/DVT Device ordered at admit?: Yes Patient seen and examined at the bedside.  No acute events overnight.  No new complaints. Denies N/V/D, CP, SOB, Headache or dizziness.    Patient scheduled discharge home yesterday delayed due to fever spike to 101F', Pt was encouraged to use I/S temperature improved to 99F'  Tolerating diet      HPI:  21y/o female with no significant PMH present with c/o RLQ pain. Pt states that she started her menses on Wednesday and had cramps, however she began to have severe sharp RLQ pain yesterday evening, non-radiating Pain was associated with nausea, vomiting X 3 episodes and loss of appetite. Denies fever/chills, denies diarrhea, dysuria or frequency. Denies CP/SOB. Last meal was 6pm yesterday, last BM was yesterday morning, denies any change in bowel habits. (23 Jun 2017 14:03)      Vital Signs Last 24 Hrs  T(C): 37.2, Max: 38.5 (06-24 @ 18:37)  T(F): 99, Max: 101.3 (06-24 @ 18:37)  HR: 84 (72 - 86)  BP: 112/62 (103/61 - 112/68)  BP(mean): --  RR: 16 (16 - 18)  SpO2: 97% (97% - 100%)                          10.9   5.9   )-----------( 195      ( 24 Jun 2017 19:30 )             32.3       06-24    141  |  107  |  6<L>  ----------------------------<  83  3.6   |  28  |  0.96    Ca    7.9<L>      24 Jun 2017 19:30    TPro  7.6  /  Alb  4.0  /  TBili  0.5  /  DBili  x   /  AST  11<L>  /  ALT  18  /  AlkPhos  45  06-23  LIVER FUNCTIONS - ( 23 Jun 2017 06:54 )  Alb: 4.0 g/dL / Pro: 7.6 gm/dL / ALK PHOS: 45 U/L / ALT: 18 U/L / AST: 11 U/L / GGT: x           I&O's Detail  I & Os for 24h ending 24 Jun 2017 07:00  =============================================  IN:    lactated ringers.: 1200 ml    lactated ringers.: 225 ml    Solution: 50 ml    Total IN: 1475 ml  ---------------------------------------------  OUT:    Voided: 300 ml    Total OUT: 300 ml  ---------------------------------------------  Total NET: 1175 ml    I & Os for current day (as of 25 Jun 2017 05:13)  =============================================  IN:    lactated ringers.: 750 ml    Oral Fluid: 380 ml    Solution: 100 ml    Total IN: 1230 ml  ---------------------------------------------  OUT:    Total OUT: 0 ml  ---------------------------------------------  Total NET: 1230 ml      MEDICATIONS  (STANDING):  heparin  Injectable 5000Unit(s) SubCutaneous every 12 hours  lactated ringers. 1000milliLiter(s) IV Continuous <Continuous>  docusate sodium 100milliGRAM(s) Oral two times a day    MEDICATIONS  (PRN):  oxyCODONE  5 mG/acetaminophen 325 mG 1Tablet(s) Oral every 4 hours PRN Mild Pain (1 - 3)  ondansetron Injectable 8milliGRAM(s) IV Push every 6 hours PRN Nausea and/or Vomiting  oxyCODONE  5 mG/acetaminophen 325 mG 2Tablet(s) Oral every 4 hours PRN Severe Pain (7 - 10)        Physical Exam  GEN: awake alert NAD  HEENT: NCAT, Trachea midline, no scleral icterus  Resp: unlabored, No SOB, CTAB  CV: No Tachycardia, S1 S2  ABD: Soft, ND expected surgical site tenderness, benign in appearance ster strips in place  : voiding  EXT: warm well perfused, no edema, no calf tenderness    A/P: 22yFemale a/w S/P Laparoscopic appendectomy POD#2, fever improved, tolerating diet, pain controlled.    -pain control prn  -f/u AM labs,   -OOB  -f/u w/ attg re: plan for likely d/c home later today      PAST MEDICAL & SURGICAL HISTORY:  No pertinent past medical history  No significant past surgical history

## 2018-10-09 ENCOUNTER — EMERGENCY (EMERGENCY)
Facility: HOSPITAL | Age: 23
LOS: 0 days | Discharge: HOME | End: 2018-10-10
Attending: EMERGENCY MEDICINE
Payer: COMMERCIAL

## 2018-10-09 VITALS
SYSTOLIC BLOOD PRESSURE: 122 MMHG | WEIGHT: 164.91 LBS | HEIGHT: 66 IN | DIASTOLIC BLOOD PRESSURE: 89 MMHG | TEMPERATURE: 98 F | RESPIRATION RATE: 17 BRPM | OXYGEN SATURATION: 100 % | HEART RATE: 86 BPM

## 2018-10-09 DIAGNOSIS — Z90.49 ACQUIRED ABSENCE OF OTHER SPECIFIED PARTS OF DIGESTIVE TRACT: Chronic | ICD-10-CM

## 2018-10-09 LAB
ALBUMIN SERPL ELPH-MCNC: 3.8 G/DL — SIGNIFICANT CHANGE UP (ref 3.3–5)
ALP SERPL-CCNC: 36 U/L — LOW (ref 40–120)
ALT FLD-CCNC: 16 U/L — SIGNIFICANT CHANGE UP (ref 12–78)
ANION GAP SERPL CALC-SCNC: 9 MMOL/L — SIGNIFICANT CHANGE UP (ref 5–17)
APPEARANCE UR: CLEAR — SIGNIFICANT CHANGE UP
AST SERPL-CCNC: 11 U/L — LOW (ref 15–37)
BACTERIA # UR AUTO: ABNORMAL
BASOPHILS # BLD AUTO: 0.03 K/UL — SIGNIFICANT CHANGE UP (ref 0–0.2)
BASOPHILS NFR BLD AUTO: 0.3 % — SIGNIFICANT CHANGE UP (ref 0–2)
BILIRUB SERPL-MCNC: 0.4 MG/DL — SIGNIFICANT CHANGE UP (ref 0.2–1.2)
BILIRUB UR-MCNC: NEGATIVE — SIGNIFICANT CHANGE UP
BLD GP AB SCN SERPL QL: SIGNIFICANT CHANGE UP
BUN SERPL-MCNC: 8 MG/DL — SIGNIFICANT CHANGE UP (ref 7–23)
CALCIUM SERPL-MCNC: 9 MG/DL — SIGNIFICANT CHANGE UP (ref 8.5–10.1)
CHLORIDE SERPL-SCNC: 106 MMOL/L — SIGNIFICANT CHANGE UP (ref 96–108)
CO2 SERPL-SCNC: 24 MMOL/L — SIGNIFICANT CHANGE UP (ref 22–31)
COLOR SPEC: YELLOW — SIGNIFICANT CHANGE UP
CREAT SERPL-MCNC: 0.71 MG/DL — SIGNIFICANT CHANGE UP (ref 0.5–1.3)
DIFF PNL FLD: NEGATIVE — SIGNIFICANT CHANGE UP
EOSINOPHIL # BLD AUTO: 0.11 K/UL — SIGNIFICANT CHANGE UP (ref 0–0.5)
EOSINOPHIL NFR BLD AUTO: 1 % — SIGNIFICANT CHANGE UP (ref 0–6)
EPI CELLS # UR: SIGNIFICANT CHANGE UP
FLUAV SPEC QL CULT: NEGATIVE — SIGNIFICANT CHANGE UP
FLUBV AG SPEC QL IA: NEGATIVE — SIGNIFICANT CHANGE UP
GLUCOSE SERPL-MCNC: 81 MG/DL — SIGNIFICANT CHANGE UP (ref 70–99)
GLUCOSE UR QL: NEGATIVE MG/DL — SIGNIFICANT CHANGE UP
HCG SERPL-ACNC: HIGH MIU/ML
HCT VFR BLD CALC: 38.4 % — SIGNIFICANT CHANGE UP (ref 34.5–45)
HGB BLD-MCNC: 12.1 G/DL — SIGNIFICANT CHANGE UP (ref 11.5–15.5)
IMM GRANULOCYTES NFR BLD AUTO: 0.3 % — SIGNIFICANT CHANGE UP (ref 0–1.5)
KETONES UR-MCNC: ABNORMAL
LEUKOCYTE ESTERASE UR-ACNC: ABNORMAL
LIDOCAIN IGE QN: 117 U/L — SIGNIFICANT CHANGE UP (ref 73–393)
LYMPHOCYTES # BLD AUTO: 2.67 K/UL — SIGNIFICANT CHANGE UP (ref 1–3.3)
LYMPHOCYTES # BLD AUTO: 23.2 % — SIGNIFICANT CHANGE UP (ref 13–44)
MCHC RBC-ENTMCNC: 24 PG — LOW (ref 27–34)
MCHC RBC-ENTMCNC: 31.5 GM/DL — LOW (ref 32–36)
MCV RBC AUTO: 76.2 FL — LOW (ref 80–100)
MONOCYTES # BLD AUTO: 0.88 K/UL — SIGNIFICANT CHANGE UP (ref 0–0.9)
MONOCYTES NFR BLD AUTO: 7.7 % — SIGNIFICANT CHANGE UP (ref 2–14)
NEUTROPHILS # BLD AUTO: 7.78 K/UL — HIGH (ref 1.8–7.4)
NEUTROPHILS NFR BLD AUTO: 67.5 % — SIGNIFICANT CHANGE UP (ref 43–77)
NITRITE UR-MCNC: NEGATIVE — SIGNIFICANT CHANGE UP
NRBC # BLD: 0 /100 WBCS — SIGNIFICANT CHANGE UP (ref 0–0)
PH UR: 7 — SIGNIFICANT CHANGE UP (ref 5–8)
PLATELET # BLD AUTO: 291 K/UL — SIGNIFICANT CHANGE UP (ref 150–400)
POTASSIUM SERPL-MCNC: 4.2 MMOL/L — SIGNIFICANT CHANGE UP (ref 3.5–5.3)
POTASSIUM SERPL-SCNC: 4.2 MMOL/L — SIGNIFICANT CHANGE UP (ref 3.5–5.3)
PROT SERPL-MCNC: 7.7 GM/DL — SIGNIFICANT CHANGE UP (ref 6–8.3)
PROT UR-MCNC: 15 MG/DL
RBC # BLD: 5.04 M/UL — SIGNIFICANT CHANGE UP (ref 3.8–5.2)
RBC # FLD: 19.6 % — HIGH (ref 10.3–14.5)
RBC CASTS # UR COMP ASSIST: SIGNIFICANT CHANGE UP /HPF (ref 0–4)
SODIUM SERPL-SCNC: 139 MMOL/L — SIGNIFICANT CHANGE UP (ref 135–145)
SP GR SPEC: 1.01 — SIGNIFICANT CHANGE UP (ref 1.01–1.02)
UROBILINOGEN FLD QL: NEGATIVE MG/DL — SIGNIFICANT CHANGE UP
WBC # BLD: 11.5 K/UL — HIGH (ref 3.8–10.5)
WBC # FLD AUTO: 11.5 K/UL — HIGH (ref 3.8–10.5)
WBC UR QL: SIGNIFICANT CHANGE UP

## 2018-10-09 PROCEDURE — 76830 TRANSVAGINAL US NON-OB: CPT | Mod: 26

## 2018-10-09 PROCEDURE — 99284 EMERGENCY DEPT VISIT MOD MDM: CPT

## 2018-10-09 RX ORDER — SODIUM CHLORIDE 9 MG/ML
3 INJECTION INTRAMUSCULAR; INTRAVENOUS; SUBCUTANEOUS ONCE
Qty: 0 | Refills: 0 | Status: COMPLETED | OUTPATIENT
Start: 2018-10-09 | End: 2018-10-09

## 2018-10-09 RX ORDER — SODIUM CHLORIDE 9 MG/ML
1000 INJECTION INTRAMUSCULAR; INTRAVENOUS; SUBCUTANEOUS ONCE
Qty: 0 | Refills: 0 | Status: COMPLETED | OUTPATIENT
Start: 2018-10-09 | End: 2018-10-09

## 2018-10-09 RX ORDER — DOXYLAMINE SUCCINATE AND PYRIDOXINE HYDROCHLORIDE, DELAYED RELEASE TABLETS 10 MG/10 MG 10; 10 MG/1; MG/1
2 TABLET, DELAYED RELEASE ORAL
Qty: 20 | Refills: 0
Start: 2018-10-09 | End: 2018-10-18

## 2018-10-09 RX ADMIN — SODIUM CHLORIDE 3 MILLILITER(S): 9 INJECTION INTRAMUSCULAR; INTRAVENOUS; SUBCUTANEOUS at 20:11

## 2018-10-09 RX ADMIN — SODIUM CHLORIDE 1000 MILLILITER(S): 9 INJECTION INTRAMUSCULAR; INTRAVENOUS; SUBCUTANEOUS at 20:11

## 2018-10-09 NOTE — ED PROVIDER NOTE - PHYSICAL EXAMINATION

## 2018-10-09 NOTE — ED PROVIDER NOTE - MEDICAL DECISION MAKING DETAILS
pt well appearing, nontoxic, likely URI and UTI. sono with IUP. dc with augmentin for LE and trace LE. Discussed results and outcome of testing with the patient.  Patient given copy of available results. Patient advised to please follow up with their PMD within the next 24 hours and return to the Emergency Department for worsening symptoms or any other concerns.

## 2018-10-09 NOTE — ED PROVIDER NOTE - OBJECTIVE STATEMENT
24yo female with no pertinent pmh, , LMP , ~ 10 weeks pregnant presents with NV, abd cramping, and temp of 100.3. Pt reports has normal sono 1 week ago. Pt also had vag spotting early on but that resolved. Pt reports mild abd cramping and 1 episode of spotting yesterday, none today. Pt reports dry cough and slight temp of 1003. Pt also reports has had NV during the first trimester, but more so in past 4 days. no travel, dysuria, productive sputum.     ROS: + fever, no chills. No photophobia/eye pain/changes in vision, No ear pain/sore throat/dysphagia, No chest pain/palpitations. No SOB/cough/stridor. +abdominal pain, +N/V, no D, no black/bloody bm. No dysuria/frequency/discharge, No headache. No Dizziness.  No rash.  No numbness/tingling/weakness.

## 2018-10-09 NOTE — ED ADULT NURSE NOTE - OBJECTIVE STATEMENT
Pt c/o of fever, N/V with abd cramping x2 days. 10 weeks pregnant. Temperature 100.3 x2 days. Pt last took Motrin at 1200. No temperature at this time. No PMH

## 2018-10-09 NOTE — ED PROVIDER NOTE - CARE PLAN
Principal Discharge DX:	UTI (urinary tract infection)  Secondary Diagnosis:	URI (upper respiratory infection)

## 2018-10-09 NOTE — ED ADULT TRIAGE NOTE - CHIEF COMPLAINT QUOTE
Pt c/o of fever, N/V x2 days. 10 weeks pregnant. Temperature 100.3 x2 days. Pt last took Motrin at 1200.

## 2018-10-10 VITALS
TEMPERATURE: 99 F | HEART RATE: 74 BPM | RESPIRATION RATE: 18 BRPM | DIASTOLIC BLOOD PRESSURE: 58 MMHG | OXYGEN SATURATION: 100 % | SYSTOLIC BLOOD PRESSURE: 110 MMHG

## 2018-10-10 DIAGNOSIS — N39.0 URINARY TRACT INFECTION, SITE NOT SPECIFIED: ICD-10-CM

## 2018-10-10 DIAGNOSIS — O26.899 OTHER SPECIFIED PREGNANCY RELATED CONDITIONS, UNSPECIFIED TRIMESTER: ICD-10-CM

## 2018-10-10 DIAGNOSIS — Z3A.09 9 WEEKS GESTATION OF PREGNANCY: ICD-10-CM

## 2018-10-10 DIAGNOSIS — J06.9 ACUTE UPPER RESPIRATORY INFECTION, UNSPECIFIED: ICD-10-CM

## 2018-10-10 DIAGNOSIS — R10.9 UNSPECIFIED ABDOMINAL PAIN: ICD-10-CM

## 2018-10-10 LAB
CULTURE RESULTS: NO GROWTH — SIGNIFICANT CHANGE UP
SPECIMEN SOURCE: SIGNIFICANT CHANGE UP

## 2018-10-10 RX ORDER — DIPHENHYDRAMINE HCL 50 MG
25 CAPSULE ORAL ONCE
Qty: 0 | Refills: 0 | Status: COMPLETED | OUTPATIENT
Start: 2018-10-10 | End: 2018-10-10

## 2018-10-10 RX ADMIN — Medication 25 MILLIGRAM(S): at 00:59

## 2018-10-10 RX ADMIN — Medication 1 TABLET(S): at 00:18

## 2018-10-31 ENCOUNTER — APPOINTMENT (OUTPATIENT)
Dept: MATERNAL FETAL MEDICINE | Facility: CLINIC | Age: 23
End: 2018-10-31

## 2018-10-31 ENCOUNTER — APPOINTMENT (OUTPATIENT)
Dept: ANTEPARTUM | Facility: CLINIC | Age: 23
End: 2018-10-31
Payer: COMMERCIAL

## 2018-10-31 ENCOUNTER — ASOB RESULT (OUTPATIENT)
Age: 23
End: 2018-10-31

## 2018-10-31 DIAGNOSIS — O35.1XX0 MATERNAL CARE FOR (SUSPECTED) CHROMOSOMAL ABNORMALITY IN FETUS, NOT APPLICABLE OR UNSPECIFIED: ICD-10-CM

## 2018-10-31 PROCEDURE — 76813 OB US NUCHAL MEAS 1 GEST: CPT

## 2018-10-31 PROCEDURE — 36416 COLLJ CAPILLARY BLOOD SPEC: CPT

## 2018-11-05 LAB
1ST TRIMESTER DATA: NORMAL
ADDENDUM DOC: NORMAL
AFP PNL SERPL: NORMAL
AFP SERPL-ACNC: NORMAL
CLINICAL BIOCHEMIST REVIEW: NORMAL
FREE BETA HCG 1ST TRIMESTER: NORMAL
Lab: NORMAL
NOTES NTD: NORMAL
NT: NORMAL
PAPP-A SERPL-ACNC: NORMAL
TRISOMY 18/3: NORMAL

## 2019-01-02 ENCOUNTER — ASOB RESULT (OUTPATIENT)
Age: 24
End: 2019-01-02

## 2019-01-02 ENCOUNTER — APPOINTMENT (OUTPATIENT)
Dept: ANTEPARTUM | Facility: CLINIC | Age: 24
End: 2019-01-02
Payer: COMMERCIAL

## 2019-01-02 PROCEDURE — 76811 OB US DETAILED SNGL FETUS: CPT

## 2019-01-02 PROCEDURE — 76817 TRANSVAGINAL US OBSTETRIC: CPT

## 2019-01-10 ENCOUNTER — APPOINTMENT (OUTPATIENT)
Dept: ANTEPARTUM | Facility: CLINIC | Age: 24
End: 2019-01-10

## 2019-02-18 ENCOUNTER — EMERGENCY (EMERGENCY)
Facility: HOSPITAL | Age: 24
LOS: 1 days | Discharge: HOME | End: 2019-02-18
Attending: EMERGENCY MEDICINE
Payer: COMMERCIAL

## 2019-02-18 VITALS
HEART RATE: 99 BPM | WEIGHT: 175.93 LBS | DIASTOLIC BLOOD PRESSURE: 78 MMHG | TEMPERATURE: 98 F | HEIGHT: 65 IN | SYSTOLIC BLOOD PRESSURE: 122 MMHG | RESPIRATION RATE: 18 BRPM | OXYGEN SATURATION: 100 %

## 2019-02-18 DIAGNOSIS — Z90.49 ACQUIRED ABSENCE OF OTHER SPECIFIED PARTS OF DIGESTIVE TRACT: Chronic | ICD-10-CM

## 2019-02-18 PROCEDURE — 99284 EMERGENCY DEPT VISIT MOD MDM: CPT | Mod: 25

## 2019-02-18 NOTE — ED ADULT TRIAGE NOTE - CHIEF COMPLAINT QUOTE
pt 28 wks pregnant and states, "my mother is vomiting and now I am vomiting too." x3 episodes of emesis

## 2019-02-19 ENCOUNTER — OUTPATIENT (OUTPATIENT)
Dept: OUTPATIENT SERVICES | Facility: HOSPITAL | Age: 24
LOS: 1 days | End: 2019-02-19
Payer: COMMERCIAL

## 2019-02-19 VITALS
DIASTOLIC BLOOD PRESSURE: 74 MMHG | HEART RATE: 98 BPM | SYSTOLIC BLOOD PRESSURE: 128 MMHG | OXYGEN SATURATION: 99 % | TEMPERATURE: 98 F | RESPIRATION RATE: 18 BRPM

## 2019-02-19 DIAGNOSIS — Z3A.00 WEEKS OF GESTATION OF PREGNANCY NOT SPECIFIED: ICD-10-CM

## 2019-02-19 DIAGNOSIS — O26.899 OTHER SPECIFIED PREGNANCY RELATED CONDITIONS, UNSPECIFIED TRIMESTER: ICD-10-CM

## 2019-02-19 DIAGNOSIS — Z90.49 ACQUIRED ABSENCE OF OTHER SPECIFIED PARTS OF DIGESTIVE TRACT: Chronic | ICD-10-CM

## 2019-02-19 LAB
ALBUMIN SERPL ELPH-MCNC: 4 G/DL — SIGNIFICANT CHANGE UP (ref 3.3–5)
ALP SERPL-CCNC: 71 U/L — SIGNIFICANT CHANGE UP (ref 40–120)
ALT FLD-CCNC: 12 U/L — SIGNIFICANT CHANGE UP (ref 10–45)
ANION GAP SERPL CALC-SCNC: 16 MMOL/L — SIGNIFICANT CHANGE UP (ref 5–17)
APPEARANCE UR: ABNORMAL
AST SERPL-CCNC: 30 U/L — SIGNIFICANT CHANGE UP (ref 10–40)
BACTERIA # UR AUTO: ABNORMAL
BASOPHILS # BLD AUTO: 0 K/UL — SIGNIFICANT CHANGE UP (ref 0–0.2)
BASOPHILS NFR BLD AUTO: 0.1 % — SIGNIFICANT CHANGE UP (ref 0–2)
BILIRUB SERPL-MCNC: 0.5 MG/DL — SIGNIFICANT CHANGE UP (ref 0.2–1.2)
BILIRUB UR-MCNC: NEGATIVE — SIGNIFICANT CHANGE UP
BUN SERPL-MCNC: 9 MG/DL — SIGNIFICANT CHANGE UP (ref 7–23)
CALCIUM SERPL-MCNC: 8.8 MG/DL — SIGNIFICANT CHANGE UP (ref 8.4–10.5)
CHLORIDE SERPL-SCNC: 101 MMOL/L — SIGNIFICANT CHANGE UP (ref 96–108)
CO2 SERPL-SCNC: 18 MMOL/L — LOW (ref 22–31)
COLOR SPEC: YELLOW — SIGNIFICANT CHANGE UP
CREAT SERPL-MCNC: 0.58 MG/DL — SIGNIFICANT CHANGE UP (ref 0.5–1.3)
DIFF PNL FLD: NEGATIVE — SIGNIFICANT CHANGE UP
EOSINOPHIL # BLD AUTO: 0 K/UL — SIGNIFICANT CHANGE UP (ref 0–0.5)
EOSINOPHIL NFR BLD AUTO: 0.2 % — SIGNIFICANT CHANGE UP (ref 0–6)
EPI CELLS # UR: 10 /HPF — HIGH
GLUCOSE SERPL-MCNC: 71 MG/DL — SIGNIFICANT CHANGE UP (ref 70–99)
GLUCOSE UR QL: NEGATIVE — SIGNIFICANT CHANGE UP
GRAN CASTS # UR COMP ASSIST: 1 /LPF — SIGNIFICANT CHANGE UP
HCT VFR BLD CALC: 35.2 % — SIGNIFICANT CHANGE UP (ref 34.5–45)
HGB BLD-MCNC: 11.8 G/DL — SIGNIFICANT CHANGE UP (ref 11.5–15.5)
HYALINE CASTS # UR AUTO: 3 /LPF — HIGH (ref 0–2)
KETONES UR-MCNC: ABNORMAL
LEUKOCYTE ESTERASE UR-ACNC: ABNORMAL
LYMPHOCYTES # BLD AUTO: 1 K/UL — SIGNIFICANT CHANGE UP (ref 1–3.3)
LYMPHOCYTES # BLD AUTO: 9.2 % — LOW (ref 13–44)
MCHC RBC-ENTMCNC: 25.6 PG — LOW (ref 27–34)
MCHC RBC-ENTMCNC: 33.6 GM/DL — SIGNIFICANT CHANGE UP (ref 32–36)
MCV RBC AUTO: 76.2 FL — LOW (ref 80–100)
MONOCYTES # BLD AUTO: 0.7 K/UL — SIGNIFICANT CHANGE UP (ref 0–0.9)
MONOCYTES NFR BLD AUTO: 6.2 % — SIGNIFICANT CHANGE UP (ref 2–14)
NEUTROPHILS # BLD AUTO: 8.9 K/UL — HIGH (ref 1.8–7.4)
NEUTROPHILS NFR BLD AUTO: 84.2 % — HIGH (ref 43–77)
NITRITE UR-MCNC: NEGATIVE — SIGNIFICANT CHANGE UP
PH UR: 6.5 — SIGNIFICANT CHANGE UP (ref 5–8)
PLATELET # BLD AUTO: 196 K/UL — SIGNIFICANT CHANGE UP (ref 150–400)
POTASSIUM SERPL-MCNC: 4.2 MMOL/L — SIGNIFICANT CHANGE UP (ref 3.5–5.3)
POTASSIUM SERPL-SCNC: 4.2 MMOL/L — SIGNIFICANT CHANGE UP (ref 3.5–5.3)
PROT SERPL-MCNC: 7.8 G/DL — SIGNIFICANT CHANGE UP (ref 6–8.3)
PROT UR-MCNC: ABNORMAL
RBC # BLD: 4.62 M/UL — SIGNIFICANT CHANGE UP (ref 3.8–5.2)
RBC # FLD: 13.6 % — SIGNIFICANT CHANGE UP (ref 10.3–14.5)
RBC CASTS # UR COMP ASSIST: 3 /HPF — SIGNIFICANT CHANGE UP (ref 0–4)
SODIUM SERPL-SCNC: 135 MMOL/L — SIGNIFICANT CHANGE UP (ref 135–145)
SP GR SPEC: 1.02 — SIGNIFICANT CHANGE UP (ref 1.01–1.02)
URATE CRY FLD QL MICRO: ABNORMAL
UROBILINOGEN FLD QL: ABNORMAL
WBC # BLD: 10.6 K/UL — HIGH (ref 3.8–10.5)
WBC # FLD AUTO: 10.6 K/UL — HIGH (ref 3.8–10.5)
WBC UR QL: 14 /HPF — HIGH (ref 0–5)

## 2019-02-19 PROCEDURE — 87086 URINE CULTURE/COLONY COUNT: CPT

## 2019-02-19 PROCEDURE — 81001 URINALYSIS AUTO W/SCOPE: CPT

## 2019-02-19 PROCEDURE — G0463: CPT

## 2019-02-19 PROCEDURE — 80053 COMPREHEN METABOLIC PANEL: CPT

## 2019-02-19 PROCEDURE — 85027 COMPLETE CBC AUTOMATED: CPT

## 2019-02-19 PROCEDURE — 76815 OB US LIMITED FETUS(S): CPT

## 2019-02-19 PROCEDURE — 59025 FETAL NON-STRESS TEST: CPT

## 2019-02-19 PROCEDURE — 96374 THER/PROPH/DIAG INJ IV PUSH: CPT

## 2019-02-19 PROCEDURE — 99284 EMERGENCY DEPT VISIT MOD MDM: CPT | Mod: 25

## 2019-02-19 RX ORDER — SODIUM CHLORIDE 9 MG/ML
2000 INJECTION, SOLUTION INTRAVENOUS ONCE
Qty: 0 | Refills: 0 | Status: COMPLETED | OUTPATIENT
Start: 2019-02-19 | End: 2019-02-19

## 2019-02-19 RX ORDER — ONDANSETRON 8 MG/1
4 TABLET, FILM COATED ORAL ONCE
Qty: 0 | Refills: 0 | Status: COMPLETED | OUTPATIENT
Start: 2019-02-19 | End: 2019-02-19

## 2019-02-19 RX ORDER — CEPHALEXIN 500 MG
500 CAPSULE ORAL ONCE
Qty: 0 | Refills: 0 | Status: COMPLETED | OUTPATIENT
Start: 2019-02-19 | End: 2019-02-19

## 2019-02-19 RX ORDER — CEPHALEXIN 500 MG
1 CAPSULE ORAL
Qty: 14 | Refills: 0
Start: 2019-02-19 | End: 2019-02-25

## 2019-02-19 RX ADMIN — Medication 500 MILLIGRAM(S): at 06:13

## 2019-02-19 RX ADMIN — ONDANSETRON 4 MILLIGRAM(S): 8 TABLET, FILM COATED ORAL at 04:28

## 2019-02-19 RX ADMIN — SODIUM CHLORIDE 2000 MILLILITER(S): 9 INJECTION, SOLUTION INTRAVENOUS at 03:33

## 2019-02-19 NOTE — ED PROVIDER NOTE - CARE PLAN
Principal Discharge DX:	Nausea and vomiting  Secondary Diagnosis:	UTI (urinary tract infection) during pregnancy, third trimester

## 2019-02-19 NOTE — ED PROVIDER NOTE - OBJECTIVE STATEMENT
24 yo female  28 weeks pregnant presenting with 1 day hx of nausea and vomiting associated with intermittent abdominal cramping which is worse than her usual cramps.  Patient has not taken anything for her symptoms.  Currently is not nauseous.  States that pain is diffuse 6/10 in severity with no radiation.  Does not currently want anything for pain.  denies urinary symptoms and trauma.  patients mother has similar symptoms.

## 2019-02-19 NOTE — ED PROVIDER NOTE - NSFOLLOWUPINSTRUCTIONS_ED_ALL_ED_FT
Please go to labor and delivery after being discharged from the emergency room.  Please take keflex twice a day for 7 days.  Drink at least 2 Liters or 64 Ounces of water each day.  Return for any persistent, worsening symptoms, or ANY concerns at all.

## 2019-02-19 NOTE — ED PROVIDER NOTE - ATTENDING CONTRIBUTION TO CARE
Attending MD Marie Cali:  I personally have seen and examined this patient.  Resident note reviewed and agree on plan of care and except where noted.  See HPI, PE, and MDM for details.

## 2019-02-19 NOTE — ED PROVIDER NOTE - CLINICAL SUMMARY MEDICAL DECISION MAKING FREE TEXT BOX
22 yo female presenting with 1 day hx of nausea and vomiting and abd cramping; likely viral illness secondary to sick contact in mother; currently not nauseous, will check labs fluids ob consult FHR re eval 22 yo female presenting with 1 day hx of nausea and vomiting and abd cramping; likely viral illness secondary to sick contact in mother; currently not nauseous, will check labs fluids ob consult FHR re eval  Attending Marie Cali: 24 y/o female at approximately 28 weeks gestation presenting with n/v/ and cramping. on exam abd soft. pt with h/o prior appendectomy. passing gas making obstruction less likely. family member with similar symptoms at home. likely viral source. abd soft on exam. will d/w oB as pt  28 weeks with some mild cramping will need fetal monitoring and eval. pt given IVF. POCUS shows normal fetal heart rate. check UA and re-eval. likey d/c if po tolerates to OB

## 2019-02-19 NOTE — ED PROVIDER NOTE - PHYSICAL EXAMINATION
gen: well appearing  Mentation: AAO x 3  psych: mood appropriate  ENT: airway patent  Eyes: conjunctivae clear bilaterally  Cardio: RRR, no m/r/g  Resp: normal BS b/l  GI: s/nt/nd   Neuro: AAO x 3, sensation and motor function intact  MSK: normal movement of all extremities gen: well appearing  Mentation: AAO x 3  psych: mood appropriate  ENT: airway patent  Eyes: conjunctivae clear bilaterally  Cardio: RRR, no m/r/g  Resp: normal BS b/l  GI: s/nt/nd   Neuro: AAO x 3, sensation and motor function intact  MSK: normal movement of all extremities  Attending Marie Cali: Gen: NAD, heent: atrauamtic, eomi, perrla, mmm, op pink, uvula midline, neck; nttp, no nuchal rigidity, chest: nttp, no crepitus, cv: rrr, no murmurs, lungs: ctab, abd: soft, nontender, nondistended, no peritoneal signs, +BS, no guarding, ext: wwp, neg homans, skin: no rash, neuro: awake and alert, following commands, speech clear, sensation and strength intact, no focal deficits

## 2019-02-19 NOTE — ED ADULT NURSE NOTE - OBJECTIVE STATEMENT
23 year old female  28 weeks pregnant presented to ED with c/o of nausea/vomiting/generalized abdominal cramping x1 day. pt denies CP, SOB, current vomiting, numbness/tingling, fever, cough, chills, dizziness, headache, blurred vision, neuro intact. pt a&ox3, lung sounds clear, heart rate regular, abdomen soft nontender nondistended to palp. skin intact. IV in right hand 22G and patent. pt currently resting in bed comfortably with significant other at bedside. Will continue to monitor and assess while offering support and reassurance.

## 2019-02-19 NOTE — ED ADULT NURSE NOTE - NSIMPLEMENTINTERV_GEN_ALL_ED
Implemented All Universal Safety Interventions:  [place] to call system. Call bell, personal items and telephone within reach. Instruct patient to call for assistance. Room bathroom lighting operational. Non-slip footwear when patient is off stretcher. Physically safe environment: no spills, clutter or unnecessary equipment. Stretcher in lowest position, wheels locked, appropriate side rails in place.

## 2019-02-20 LAB
CULTURE RESULTS: SIGNIFICANT CHANGE UP
SPECIMEN SOURCE: SIGNIFICANT CHANGE UP

## 2019-02-25 ENCOUNTER — ASOB RESULT (OUTPATIENT)
Age: 24
End: 2019-02-25

## 2019-02-25 ENCOUNTER — APPOINTMENT (OUTPATIENT)
Dept: ANTEPARTUM | Facility: CLINIC | Age: 24
End: 2019-02-25
Payer: COMMERCIAL

## 2019-02-25 PROCEDURE — 76816 OB US FOLLOW-UP PER FETUS: CPT

## 2019-02-25 PROCEDURE — 76819 FETAL BIOPHYS PROFIL W/O NST: CPT

## 2019-04-17 ENCOUNTER — APPOINTMENT (OUTPATIENT)
Dept: ANTEPARTUM | Facility: CLINIC | Age: 24
End: 2019-04-17

## 2019-12-11 NOTE — ED ADULT NURSE NOTE - CINV DISCH MEDS REVIEWED YN
[FreeTextEntry1] : \par Postmenopausal bleeding-\par The risks and benefits of the procedure was discussed with the patient and the patient signed informed consent. Cervix was prepped with Betadine. Anterior lip of the cervix was clasped with a ring forceps. The endometrial pipelle was passed twice and the specimen was sent to pathology for testing. Ring forceps was removed. Hemostatic. Bleeding precautions given to patient. Patient tolerated well.\par \par  Yes

## 2020-05-13 ENCOUNTER — EMERGENCY (EMERGENCY)
Facility: HOSPITAL | Age: 25
LOS: 1 days | Discharge: HOME | End: 2020-05-13
Attending: EMERGENCY MEDICINE
Payer: COMMERCIAL

## 2020-05-13 VITALS
SYSTOLIC BLOOD PRESSURE: 122 MMHG | RESPIRATION RATE: 18 BRPM | OXYGEN SATURATION: 98 % | DIASTOLIC BLOOD PRESSURE: 74 MMHG | TEMPERATURE: 98 F | HEART RATE: 82 BPM

## 2020-05-13 VITALS
HEIGHT: 66 IN | WEIGHT: 179.9 LBS | RESPIRATION RATE: 17 BRPM | SYSTOLIC BLOOD PRESSURE: 129 MMHG | TEMPERATURE: 98 F | DIASTOLIC BLOOD PRESSURE: 88 MMHG | OXYGEN SATURATION: 99 % | HEART RATE: 79 BPM

## 2020-05-13 DIAGNOSIS — Z90.49 ACQUIRED ABSENCE OF OTHER SPECIFIED PARTS OF DIGESTIVE TRACT: Chronic | ICD-10-CM

## 2020-05-13 PROCEDURE — 99284 EMERGENCY DEPT VISIT MOD MDM: CPT

## 2020-05-13 PROCEDURE — 70450 CT HEAD/BRAIN W/O DYE: CPT | Mod: 26

## 2020-05-13 PROCEDURE — 99284 EMERGENCY DEPT VISIT MOD MDM: CPT | Mod: 25

## 2020-05-13 PROCEDURE — 70450 CT HEAD/BRAIN W/O DYE: CPT

## 2020-05-13 RX ORDER — ACETAMINOPHEN 500 MG
975 TABLET ORAL ONCE
Refills: 0 | Status: COMPLETED | OUTPATIENT
Start: 2020-05-13 | End: 2020-05-13

## 2020-05-13 RX ADMIN — Medication 975 MILLIGRAM(S): at 21:33

## 2020-05-13 NOTE — ED PROVIDER NOTE - PATIENT PORTAL LINK FT
You can access the FollowMyHealth Patient Portal offered by [facility] by registering at the following website: [email or web address]. By joining [facility]’s FollowMyHealth portal, you will also be able to view your health information using other applications (apps) compatible with our system.

## 2020-05-13 NOTE — ED ADULT NURSE NOTE - OBJECTIVE STATEMENT
25y Female A&Ox3 came to ED c/o headache. PSH of .  Pt states she has intermittent headaches since past Sat, when pain becomes worse, L eye blurriness, tingling and numbness on L hand.  Pt had consultation today and was told to come in. Pt presents with 6/10 dull headache, slight numbness on L fingers, full facial symmetry, PERRL. Denies chest pain, sob, n/v/d, abdominal pain, f/c, urinary symptoms, hematuria. Upon examination, full facial symmetry, PERRL, no JVD or trach deviation, lung sounds clear and adequate chest rise, S1 and S2 heart sounds present, +2 pulses in all extremities with full ROM and equal strength, cap refill <2 seconds, ABD soft, non distended and non tender, ABD sounds present, pelvis intact, Pt reports normal bowel movements and urinary symptoms.

## 2020-05-13 NOTE — ED ADULT NURSE NOTE - NSIMPLEMENTINTERV_GEN_ALL_ED
Implemented All Universal Safety Interventions:  [place] to call system. Call bell, personal items and telephone within reach. Instruct patient to call for assistance. Room bathroom lighting operational. Non-slip footwear when patient is off stretcher. Physically safe environment: no spills, clutter or unnecessary equipment. Stretcher in lowest position, wheels locked, appropriate side rails in place.

## 2020-05-13 NOTE — ED PROVIDER NOTE - PROGRESS NOTE DETAILS
GILDARDO Azevedo MD: VENTURA completely resolved with tylenol. No FNDs. VSS. CT and hcg results both negative. All questions answered. Recommend tylenol/motrin prn pain and outpt Neuro f/u with return precautions.

## 2020-05-13 NOTE — ED PROVIDER NOTE - NSFOLLOWUPCLINICS_GEN_ALL_ED_FT
[facility]  Neurology  [address] [place] [address] - [address] [place] [address]  Phone: [phone]  Fax:   Follow Up Time: 4-6 Days

## 2020-05-13 NOTE — ED PROVIDER NOTE - NSFOLLOWUPINSTRUCTIONS_ED_ALL_ED_FT
Please follow up with your primary care doctor within 1 week.  Follow up with neurology (see attached)    Take Tylenol 650mg every 6 hrs as needed for pain.  Take Motrin 400-600mg every 6 hrs as needed for pain. Take with food    Stay well hydrated. Sleep.    Return to ED for worsening headaches, loss of consciousness, severe vomiting, or any other concerns Please follow up with your primary care doctor within 1 week.  Follow up with neurology (see attached)    Take Tylenol 650mg every 6 hrs as needed for pain.  Take Motrin 400-600mg every 6 hrs as needed for pain. Take with food    Stay well hydrated. Sleep.    Return to ED for worsening headaches, loss of consciousness, severe vomiting, or any other concerns    General Headache    WHAT YOU NEED TO KNOW:    Headache pain may be mild or severe. Common causes include stress, medicines, and head injuries. Sleep problems, allergies, and hormone changes can also cause a headache. You may have frequent headaches that have no clear cause. Pain may start in another part of your body and move to your head. Headache pain can also move to other parts of your body. A headache can cause other symptoms, such as nausea and vomiting. A severe headache may be a sign of a stroke or other serious problem that needs immediate treatment.    DISCHARGE INSTRUCTIONS:    Call 911 for any of the following:     You have any of the following signs of a stroke:   Numbness or drooping on one side of your face       Weakness in an arm or leg      Confusion or difficulty speaking      Dizziness, a severe headache, or vision loss        Return to the emergency department if:     You have a headache with neck stiffness and a fever.      You have a constant headache and are vomiting.      You have severe pain that does not get better after you take pain medicine.      You have a headache and the pain worsens when you look into light.      You have a headache and vision changes, such as blurred vision.      You have a headache and are forgetful or confused.    Contact your healthcare provider if:     You have a headache each day that does not get better, even after treatment.      You have changes in your headaches, or new symptoms that occur when you have a headache.      Others you live or work with also have headaches.      You have questions or concerns about your condition or care.    Medicines: You may need any of the following:     Medicines may be given to prevent or treat headache pain. Do not wait until the pain is severe to take your medicine. Ask your healthcare provider how to take the medicine safely.       NSAIDs, such as ibuprofen, help decrease swelling, pain, and fever. This medicine is available with or without a doctor's order. NSAIDs can cause stomach bleeding or kidney problems in certain people. If you take blood thinner medicine, always ask if NSAIDs are safe for you. Always read the medicine label and follow directions. Do not give these medicines to children under 6 months of age without direction from your child's healthcare provider.      Acetaminophen decreases pain and fever. It is available without a doctor's order. Ask how much to take and how often to take it. Follow directions. Read the labels of all other medicines you are using to see if they also contain acetaminophen, or ask your doctor or pharmacist. Acetaminophen can cause liver damage if not taken correctly. Do not use more than 4 grams (4,000 milligrams) total of acetaminophen in one day.       Antinausea medicine may be given to calm your stomach and help prevent vomiting.      Take your medicine as directed. Contact your healthcare provider if you think your medicine is not helping or if you have side effects. Tell him of her if you are allergic to any medicine. Keep a list of the medicines, vitamins, and herbs you take. Include the amounts, and when and why you take them. Bring the list or the pill bottles to follow-up visits. Carry your medicine list with you in case of an emergency.    Manage your symptoms:     Rest in a dark and quiet room. This may help decrease your pain.      Apply heat or ice as directed. Heat or ice may help decrease pain or muscle spasms. Apply heat or ice on the area for 20 minutes every 2 hours for as many days as directed. Your healthcare provider may recommend that you alternate heat and ice.      Relax your muscles to help relieve a headache. Lie down in a comfortable position and close your eyes. Relax your muscles slowly. Start at your toes and work your way up your body. A massage or warm bath may also help relax your muscles.    Keep a headache record: Record the dates and times that you get headaches, and what you were doing before the headache started. Also record what you ate and drank in the 24 hours before the headache started. This might help your healthcare provider find the cause of your headaches and make a treatment plan. The record can also help you avoid headache triggers or manage your symptoms.    Get enough sleep: You should get 8 to 10 hours of sleep each night. Create a sleep schedule. Go to bed and wake up at the same times each day. It may be helpful to do something relaxing before bed. Do not watch television right before bed.    Do not smoke: Nicotine and other chemicals in cigarettes and cigars can trigger a headache or make it worse. Ask your healthcare provider for information if you currently smoke and need help to quit. E-cigarettes or smokeless tobacco still contain nicotine. Talk to your healthcare provider before you use these products.     Drink liquids as directed: You may need to drink more liquid to prevent dehydration. Dehydration can cause a headache. Ask your healthcare provider how much liquid to drink each day and which liquids are best for you.     Limit caffeine and alcohol as directed: Your headaches may be triggered by caffeine or alcohol. You may also develop a headache if you drink caffeine regularly and suddenly stop.    Eat a variety of healthy foods: Do not skip meals. Too little food can trigger a headache. Include fruits, vegetables, whole-grain breads, low-fat dairy products, beans, lean meat, and fish. Do not have trigger foods, such as chocolate and red wine. Foods that contain gluten, nitrates, MSG, or artificial sweeteners may also trigger a headache.    Follow up with your healthcare provider as directed: Write down your questions so you remember to ask them during your visits.

## 2020-05-13 NOTE — ED PROVIDER NOTE - OBJECTIVE STATEMENT
25y f PMHx  x1, appendectomy p/w headache. Pt reports left sided headache with associated tingling in her left hand and blurred vision in her left eye. The HAs have been daily for a week, sudden onset, aching lasting 10 minutes to an hour with associated nausea. In ED pt reports HA is tolerable, declined IV medication or IVF. Denies photophobia, vomiting, weakness, slurred speech, neck stiffness, CP, SOB, fever, chills, abd pain, rhinorrhea, or conjunctivitis. LMP last week. Nonsmoker. Denies caffeine use. 25y f PMHx  x1, appendectomy p/w headache. Pt reports left sided headache with associated tingling in her left hand and blurred vision in her left eye. The HAs have been daily for a week, sudden onset, aching lasting 10 minutes to an hour with associated nausea. In ED pt reports HA is tolerable, declined IV medication or IVF. Denies preceding sensory symptoms. Denies photophobia, vomiting, weakness, slurred speech, neck stiffness, CP, SOB, fever, chills, abd pain, rhinorrhea, or conjunctivitis. LMP last week. Nonsmoker. Denies caffeine use. Denies worst HA of life, denies HA waking her from sleep.

## 2020-10-27 ENCOUNTER — TRANSCRIPTION ENCOUNTER (OUTPATIENT)
Age: 25
End: 2020-10-27

## 2021-01-18 NOTE — ED PROVIDER NOTE - INTERPRETATION
Bill For Surgical Tray: no Expected Date Of Service: 01/18/2021 Billing Type: Third-Party Bill normal sinus rhythm

## 2021-12-09 ENCOUNTER — TRANSCRIPTION ENCOUNTER (OUTPATIENT)
Age: 26
End: 2021-12-09

## 2022-04-08 ENCOUNTER — APPOINTMENT (OUTPATIENT)
Dept: ENDOCRINOLOGY | Facility: CLINIC | Age: 27
End: 2022-04-08

## 2022-05-19 ENCOUNTER — OUTPATIENT (OUTPATIENT)
Dept: OUTPATIENT SERVICES | Facility: HOSPITAL | Age: 27
LOS: 1 days | Discharge: HOME | End: 2022-05-19

## 2022-05-19 VITALS
RESPIRATION RATE: 18 BRPM | DIASTOLIC BLOOD PRESSURE: 76 MMHG | HEART RATE: 78 BPM | OXYGEN SATURATION: 98 % | SYSTOLIC BLOOD PRESSURE: 112 MMHG | HEIGHT: 65 IN | TEMPERATURE: 98 F | WEIGHT: 189.82 LBS

## 2022-05-19 DIAGNOSIS — K43.6 OTHER AND UNSPECIFIED VENTRAL HERNIA WITH OBSTRUCTION, WITHOUT GANGRENE: ICD-10-CM

## 2022-05-19 DIAGNOSIS — Z90.49 ACQUIRED ABSENCE OF OTHER SPECIFIED PARTS OF DIGESTIVE TRACT: Chronic | ICD-10-CM

## 2022-05-19 DIAGNOSIS — Z01.818 ENCOUNTER FOR OTHER PREPROCEDURAL EXAMINATION: ICD-10-CM

## 2022-05-19 DIAGNOSIS — Z98.891 HISTORY OF UTERINE SCAR FROM PREVIOUS SURGERY: Chronic | ICD-10-CM

## 2022-05-19 DIAGNOSIS — Z01.812 ENCOUNTER FOR PREPROCEDURAL LABORATORY EXAMINATION: ICD-10-CM

## 2022-05-19 LAB
ANION GAP SERPL CALC-SCNC: 6 MMOL/L — SIGNIFICANT CHANGE UP (ref 5–17)
BUN SERPL-MCNC: 14 MG/DL — SIGNIFICANT CHANGE UP (ref 7–23)
CALCIUM SERPL-MCNC: 9 MG/DL — SIGNIFICANT CHANGE UP (ref 8.5–10.1)
CHLORIDE SERPL-SCNC: 105 MMOL/L — SIGNIFICANT CHANGE UP (ref 96–108)
CO2 SERPL-SCNC: 29 MMOL/L — SIGNIFICANT CHANGE UP (ref 22–31)
CREAT SERPL-MCNC: 0.96 MG/DL — SIGNIFICANT CHANGE UP (ref 0.5–1.3)
EGFR: 83 ML/MIN/1.73M2 — SIGNIFICANT CHANGE UP
FLUAV AG NPH QL: SIGNIFICANT CHANGE UP
FLUBV AG NPH QL: SIGNIFICANT CHANGE UP
GLUCOSE SERPL-MCNC: 83 MG/DL — SIGNIFICANT CHANGE UP (ref 70–99)
HCG UR QL: NEGATIVE — SIGNIFICANT CHANGE UP
HCT VFR BLD CALC: 39.3 % — SIGNIFICANT CHANGE UP (ref 34.5–45)
HGB BLD-MCNC: 12.3 G/DL — SIGNIFICANT CHANGE UP (ref 11.5–15.5)
MCHC RBC-ENTMCNC: 25.2 PG — LOW (ref 27–34)
MCHC RBC-ENTMCNC: 31.3 G/DL — LOW (ref 32–36)
MCV RBC AUTO: 80.5 FL — SIGNIFICANT CHANGE UP (ref 80–100)
NRBC # BLD: 0 /100 WBCS — SIGNIFICANT CHANGE UP (ref 0–0)
PLATELET # BLD AUTO: 330 K/UL — SIGNIFICANT CHANGE UP (ref 150–400)
POTASSIUM SERPL-MCNC: 3.9 MMOL/L — SIGNIFICANT CHANGE UP (ref 3.5–5.3)
POTASSIUM SERPL-SCNC: 3.9 MMOL/L — SIGNIFICANT CHANGE UP (ref 3.5–5.3)
RBC # BLD: 4.88 M/UL — SIGNIFICANT CHANGE UP (ref 3.8–5.2)
RBC # FLD: 16.2 % — HIGH (ref 10.3–14.5)
SARS-COV-2 RNA SPEC QL NAA+PROBE: SIGNIFICANT CHANGE UP
SODIUM SERPL-SCNC: 140 MMOL/L — SIGNIFICANT CHANGE UP (ref 135–145)
WBC # BLD: 7.65 K/UL — SIGNIFICANT CHANGE UP (ref 3.8–10.5)
WBC # FLD AUTO: 7.65 K/UL — SIGNIFICANT CHANGE UP (ref 3.8–10.5)

## 2022-05-19 RX ORDER — IBUPROFEN 200 MG
2 TABLET ORAL
Qty: 0 | Refills: 0 | DISCHARGE

## 2022-05-19 NOTE — H&P PST ADULT - MARITAL STATUS
04/13/22 0016   RT Protocol   History Pulmonary Disease 2   Respiratory pattern 2   Breath sounds 2   Cough 1   Indications for Bronchodilator Therapy On home bronchodilators   Bronchodilator Assessment Score 7 Single

## 2022-05-19 NOTE — H&P PST ADULT - PROBLEM SELECTOR PLAN 1
Repair incarcerated ventral hernia with mesh  Pre-op instructions given by PA, patient verbalized understanding  Chlorhexidine wash instructions given

## 2022-05-19 NOTE — H&P PST ADULT - HISTORY OF PRESENT ILLNESS
27F no pmhx c/o abdominal pain and bulge found to have ventral hernia here for PST for scheduled repair of incarcerated ventral hernia with mesh  This patient denies any fever, cough, sob, flu like symptoms  this patient reports recently traveling to [place]22 per patient had two negative COVID swabs

## 2022-05-19 NOTE — H&P PST ADULT - NSANTHOSAYNRD_GEN_A_CORE
No. BONNY screening performed.  STOP BANG Legend: 0-2 = LOW Risk; 3-4 = INTERMEDIATE Risk; 5-8 = HIGH Risk

## 2022-05-19 NOTE — H&P PST ADULT - ASSESSMENT
27F no pmhx c/o abdominal pain and bulge found to have ventral hernia here for PST for scheduled repair of incarcerated ventral hernia with mesh  CAPRINI SCORE    AGE RELATED RISK FACTORS                                                       MOBILITY RELATED FACTORS  [ ] Age 41-60 years                                            (1 Point)                  [ ] Bed rest                                                        (1 Point)  [ ] Age: 61-74 years                                           (2 Points)                [ ] Plaster cast                                                   (2 Points)  [ ] Age= 75 years                                              (3 Points)                 [ ] Bed bound for more than 72 hours                   (2 Points)    DISEASE RELATED RISK FACTORS                                               GENDER SPECIFIC FACTORS  [ ] Edema in the lower extremities                       (1 Point)                  [ ] Pregnancy                                                     (1 Point)  [ ] Varicose veins                                               (1 Point)                  [ ] Post-partum < 6 weeks                                   (1 Point)             [x ] BMI > 25 Kg/m2                                            (1 Point)                  [ ] Hormonal therapy  or oral contraception            (1 Point)                 [ ] Sepsis (in the previous month)                        (1 Point)                  [ ] History of pregnancy complications  [ ] Pneumonia or serious lung disease                                               [ ] Unexplained or recurrent                       (1 Point)           (in the previous month)                               (1 Point)  [ ] Abnormal pulmonary function test                     (1 Point)                 SURGERY RELATED RISK FACTORS  [ ] Acute myocardial infarction                              (1 Point)                 [ ]  Section                                            (1 Point)  [ ] Congestive heart failure (in the previous month)  (1 Point)                 [ ] Minor surgery                                                 (1 Point)   [ ] Inflammatory bowel disease                             (1 Point)                 [ ] Arthroscopic surgery                                        (2 Points)  [ ] Central venous access                                    (2 Points)                [ x] General surgery lasting more than 45 minutes   (2 Points)       [ ] Stroke (in the previous month)                          (5 Points)               [ ] Elective arthroplasty                                        (5 Points)                                                                                                                                               HEMATOLOGY RELATED FACTORS                                                 TRAUMA RELATED RISK FACTORS  [ ] Prior episodes of VTE                                     (3 Points)                 [ ] Fracture of the hip, pelvis, or leg                       (5 Points)  [ ] Positive family history for VTE                         (3 Points)                 [ ] Acute spinal cord injury (in the previous month)  (5 Points)  [ ] Prothrombin 58348 A                                      (3 Points)                 [ ] Paralysis  (less than 1 month)                          (5 Points)  [ ] Factor V Leiden                                             (3 Points)                 [ ] Multiple Trauma within 1 month                         (5 Points)  [ ] Lupus anticoagulants                                     (3 Points)                                                           [ ] Anticardiolipin antibodies                                (3 Points)                                                       [ ] High homocysteine in the blood                      (3 Points)                                             [ ] Other congenital or acquired thrombophilia       (3 Points)                                                [ ] Heparin induced thrombocytopenia                  (3 Points)                                          Total Score [    3      ]

## 2022-05-20 ENCOUNTER — TRANSCRIPTION ENCOUNTER (OUTPATIENT)
Age: 27
End: 2022-05-20

## 2022-05-22 ENCOUNTER — TRANSCRIPTION ENCOUNTER (OUTPATIENT)
Age: 27
End: 2022-05-22

## 2022-05-23 ENCOUNTER — TRANSCRIPTION ENCOUNTER (OUTPATIENT)
Age: 27
End: 2022-05-23

## 2022-05-23 ENCOUNTER — OUTPATIENT (OUTPATIENT)
Dept: OUTPATIENT SERVICES | Facility: HOSPITAL | Age: 27
LOS: 1 days | Discharge: HOME | End: 2022-05-23
Payer: COMMERCIAL

## 2022-05-23 ENCOUNTER — RESULT REVIEW (OUTPATIENT)
Age: 27
End: 2022-05-23

## 2022-05-23 VITALS
OXYGEN SATURATION: 97 % | DIASTOLIC BLOOD PRESSURE: 73 MMHG | HEIGHT: 65 IN | RESPIRATION RATE: 18 BRPM | HEART RATE: 73 BPM | TEMPERATURE: 99 F | WEIGHT: 189.82 LBS | SYSTOLIC BLOOD PRESSURE: 108 MMHG

## 2022-05-23 VITALS
DIASTOLIC BLOOD PRESSURE: 73 MMHG | OXYGEN SATURATION: 98 % | SYSTOLIC BLOOD PRESSURE: 108 MMHG | RESPIRATION RATE: 18 BRPM | HEART RATE: 84 BPM | TEMPERATURE: 98 F

## 2022-05-23 DIAGNOSIS — Z90.49 ACQUIRED ABSENCE OF OTHER SPECIFIED PARTS OF DIGESTIVE TRACT: Chronic | ICD-10-CM

## 2022-05-23 DIAGNOSIS — Z98.891 HISTORY OF UTERINE SCAR FROM PREVIOUS SURGERY: Chronic | ICD-10-CM

## 2022-05-23 LAB — HCG UR QL: NEGATIVE — SIGNIFICANT CHANGE UP

## 2022-05-23 PROCEDURE — 88302 TISSUE EXAM BY PATHOLOGIST: CPT | Mod: 26

## 2022-05-23 DEVICE — MESH HERNIA VENTRALEX ST SMALL 1.7": Type: IMPLANTABLE DEVICE | Status: FUNCTIONAL

## 2022-05-23 RX ORDER — SODIUM CHLORIDE 9 MG/ML
3 INJECTION INTRAMUSCULAR; INTRAVENOUS; SUBCUTANEOUS EVERY 8 HOURS
Refills: 0 | Status: DISCONTINUED | OUTPATIENT
Start: 2022-05-23 | End: 2022-05-23

## 2022-05-23 RX ORDER — ONDANSETRON 8 MG/1
4 TABLET, FILM COATED ORAL ONCE
Refills: 0 | Status: COMPLETED | OUTPATIENT
Start: 2022-05-23 | End: 2022-05-23

## 2022-05-23 RX ORDER — SODIUM CHLORIDE 9 MG/ML
1000 INJECTION, SOLUTION INTRAVENOUS
Refills: 0 | Status: DISCONTINUED | OUTPATIENT
Start: 2022-05-23 | End: 2022-05-24

## 2022-05-23 RX ORDER — KETOROLAC TROMETHAMINE 30 MG/ML
1 SYRINGE (ML) INJECTION
Qty: 12 | Refills: 0
Start: 2022-05-23

## 2022-05-23 RX ORDER — OXYCODONE HYDROCHLORIDE 5 MG/1
10 TABLET ORAL ONCE
Refills: 0 | Status: DISCONTINUED | OUTPATIENT
Start: 2022-05-23 | End: 2022-05-23

## 2022-05-23 RX ORDER — FENTANYL CITRATE 50 UG/ML
25 INJECTION INTRAVENOUS
Refills: 0 | Status: DISCONTINUED | OUTPATIENT
Start: 2022-05-23 | End: 2022-05-24

## 2022-05-23 RX ADMIN — OXYCODONE HYDROCHLORIDE 10 MILLIGRAM(S): 5 TABLET ORAL at 14:42

## 2022-05-23 RX ADMIN — SODIUM CHLORIDE 50 MILLILITER(S): 9 INJECTION, SOLUTION INTRAVENOUS at 11:56

## 2022-05-23 RX ADMIN — FENTANYL CITRATE 25 MICROGRAM(S): 50 INJECTION INTRAVENOUS at 13:01

## 2022-05-23 RX ADMIN — OXYCODONE HYDROCHLORIDE 10 MILLIGRAM(S): 5 TABLET ORAL at 14:26

## 2022-05-23 RX ADMIN — ONDANSETRON 4 MILLIGRAM(S): 8 TABLET, FILM COATED ORAL at 11:56

## 2022-05-23 RX ADMIN — FENTANYL CITRATE 25 MICROGRAM(S): 50 INJECTION INTRAVENOUS at 12:08

## 2022-05-23 RX ADMIN — FENTANYL CITRATE 25 MICROGRAM(S): 50 INJECTION INTRAVENOUS at 12:46

## 2022-05-23 RX ADMIN — FENTANYL CITRATE 25 MICROGRAM(S): 50 INJECTION INTRAVENOUS at 11:54

## 2022-05-23 RX ADMIN — SODIUM CHLORIDE 3 MILLILITER(S): 9 INJECTION INTRAMUSCULAR; INTRAVENOUS; SUBCUTANEOUS at 08:45

## 2022-05-23 NOTE — ASU DISCHARGE PLAN (ADULT/PEDIATRIC) - CARE PROVIDER_API CALL
Lauren Rolon)  Surgery  [address] [place] [address]  Phone: [phone]  Fax: [phone]  Established Patient  Follow Up Time:

## 2022-05-23 NOTE — ASU PATIENT PROFILE, ADULT - INTERNATIONAL TRAVEL DAYS 1
What Type Of Note Output Would You Prefer (Optional)?: Bullet Format How Severe Is Your Rash?: mild Is This A New Presentation, Or A Follow-Up?: Rash 0-6 days ([place])

## 2022-05-24 LAB — SURGICAL PATHOLOGY STUDY: SIGNIFICANT CHANGE UP

## 2022-06-03 DIAGNOSIS — K43.9 VENTRAL HERNIA WITHOUT OBSTRUCTION OR GANGRENE: ICD-10-CM

## 2022-06-03 DIAGNOSIS — Z88.5 ALLERGY STATUS TO NARCOTIC AGENT: ICD-10-CM

## 2022-06-03 DIAGNOSIS — K43.6 OTHER AND UNSPECIFIED VENTRAL HERNIA WITH OBSTRUCTION, WITHOUT GANGRENE: ICD-10-CM

## 2022-06-13 ENCOUNTER — RESULT REVIEW (OUTPATIENT)
Age: 27
End: 2022-06-13

## 2022-07-28 NOTE — ED PROVIDER NOTE - NS ED MD DISPO ISOLATION TYPES
Prior to immunization administration, verified patients identity using patient s name and date of birth. Please see Immunization Activity for additional information.     Screening Questionnaire for Adult Immunization    Are you sick today?   No   Do you have allergies to medications, food, a vaccine component or latex?   No   Have you ever had a serious reaction after receiving a vaccination?   No   Do you have a long-term health problem with heart, lung, kidney, or metabolic disease (e.g., diabetes), asthma, a blood disorder, no spleen, complement component deficiency, a cochlear implant, or a spinal fluid leak?  Are you on long-term aspirin therapy?   No   Do you have cancer, leukemia, HIV/AIDS, or any other immune system problem?   No   Do you have a parent, brother, or sister with an immune system problem?   No   In the past 3 months, have you taken medications that affect  your immune system, such as prednisone, other steroids, or anticancer drugs; drugs for the treatment of rheumatoid arthritis, Crohn s disease, or psoriasis; or have you had radiation treatments?   No   Have you had a seizure, or a brain or other nervous system problem?   No   During the past year, have you received a transfusion of blood or blood    products, or been given immune (gamma) globulin or antiviral drug?   No   For women: Are you pregnant or is there a chance you could become       pregnant during the next month?   No   Have you received any vaccinations in the past 4 weeks?   No     Immunization questionnaire answers were all negative.        Per orders of Dr. Hill, injection of Hep B given by Marlene Saleh MA. Patient instructed to remain in clinic for 15 minutes afterwards, and to report any adverse reaction to me immediately.       Screening performed by Marlene Saleh MA on 7/28/2022 at 8:39 AM.    
None

## 2022-08-01 ENCOUNTER — NON-APPOINTMENT (OUTPATIENT)
Age: 27
End: 2022-08-01

## 2022-12-21 ENCOUNTER — LABORATORY RESULT (OUTPATIENT)
Age: 27
End: 2022-12-21

## 2022-12-21 ENCOUNTER — ASOB RESULT (OUTPATIENT)
Age: 27
End: 2022-12-21

## 2022-12-21 ENCOUNTER — APPOINTMENT (OUTPATIENT)
Dept: ANTEPARTUM | Facility: CLINIC | Age: 27
End: 2022-12-21

## 2022-12-21 ENCOUNTER — APPOINTMENT (OUTPATIENT)
Dept: MATERNAL FETAL MEDICINE | Facility: CLINIC | Age: 27
End: 2022-12-21

## 2022-12-21 PROCEDURE — 36415 COLL VENOUS BLD VENIPUNCTURE: CPT

## 2022-12-21 PROCEDURE — 99204 OFFICE O/P NEW MOD 45 MIN: CPT | Mod: 95

## 2022-12-21 PROCEDURE — 76801 OB US < 14 WKS SINGLE FETUS: CPT

## 2022-12-21 PROCEDURE — 76813 OB US NUCHAL MEAS 1 GEST: CPT | Mod: 59

## 2023-01-06 NOTE — ED ADULT TRIAGE NOTE - WEIGHT IN LBS
Hot salt water gargles, hot liquids with/without honey, saline nasal spray as directed and/or trial of OTC cough/cold remedies (guaifenesin withDM) for symptom management.  Ibuprofen and or Tylenol as directed on package for fever, pain and body aches.  Increase fluid intake and rest.  Humidifier in room at night and mentholated steam (pot or shower) using a mentholated ointment (eg. Vicks vapo rub)    Directions for Steam Inhalation:  Fill a 1-qt. stainless steel or aluminum sauce pan 1/2 full of clean water.  Place the pan on a stove burner.  Turn the burner on high and heat the water until it just starts steaming--do not bring it to a boil.  Turn off the burner beneath the pot.  If you are using an electric stove, move the pot off of the burner entirely after turning it off.  If you left the pot on too long and it started bubbling, wait now until the water is simply steaming and no longer bubbling.  Carefully place 1 tsp. of Vicks VapoRub into the water and let it start to dissolve.  The vapors should start immediately releasing now into the steam.  Place a trivet on a table or other sturdy surface and carefully place the pan on this trivet.  Sit near the pot.  Close your eyes and breathe in through your nose, but do not place your face over the pan until it has sufficiently cooled or you risk steam burns.     175.9

## 2023-01-11 ENCOUNTER — APPOINTMENT (OUTPATIENT)
Dept: ANTEPARTUM | Facility: CLINIC | Age: 28
End: 2023-01-11
Payer: COMMERCIAL

## 2023-01-11 ENCOUNTER — ASOB RESULT (OUTPATIENT)
Age: 28
End: 2023-01-11

## 2023-01-11 PROCEDURE — 99212 OFFICE O/P EST SF 10 MIN: CPT | Mod: 25

## 2023-01-11 PROCEDURE — 76817 TRANSVAGINAL US OBSTETRIC: CPT

## 2023-01-11 PROCEDURE — 76815 OB US LIMITED FETUS(S): CPT

## 2023-01-12 ENCOUNTER — OUTPATIENT (OUTPATIENT)
Dept: OUTPATIENT SERVICES | Facility: HOSPITAL | Age: 28
LOS: 1 days | End: 2023-01-12

## 2023-01-12 VITALS
OXYGEN SATURATION: 98 % | RESPIRATION RATE: 16 BRPM | DIASTOLIC BLOOD PRESSURE: 74 MMHG | HEART RATE: 84 BPM | TEMPERATURE: 99 F | WEIGHT: 186.07 LBS | SYSTOLIC BLOOD PRESSURE: 109 MMHG | HEIGHT: 65 IN

## 2023-01-12 DIAGNOSIS — O26.872 CERVICAL SHORTENING, SECOND TRIMESTER: ICD-10-CM

## 2023-01-12 DIAGNOSIS — Z98.890 OTHER SPECIFIED POSTPROCEDURAL STATES: Chronic | ICD-10-CM

## 2023-01-12 LAB
BLD GP AB SCN SERPL QL: NEGATIVE — SIGNIFICANT CHANGE UP
HCT VFR BLD CALC: 34.2 % — LOW (ref 34.5–45)
HGB BLD-MCNC: 11.1 G/DL — LOW (ref 11.5–15.5)
MCHC RBC-ENTMCNC: 26.3 PG — LOW (ref 27–34)
MCHC RBC-ENTMCNC: 32.5 GM/DL — SIGNIFICANT CHANGE UP (ref 32–36)
MCV RBC AUTO: 81 FL — SIGNIFICANT CHANGE UP (ref 80–100)
NRBC # BLD: 0 /100 WBCS — SIGNIFICANT CHANGE UP (ref 0–0)
NRBC # FLD: 0 K/UL — SIGNIFICANT CHANGE UP (ref 0–0)
PLATELET # BLD AUTO: 226 K/UL — SIGNIFICANT CHANGE UP (ref 150–400)
RBC # BLD: 4.22 M/UL — SIGNIFICANT CHANGE UP (ref 3.8–5.2)
RBC # FLD: 14.9 % — HIGH (ref 10.3–14.5)
RH IG SCN BLD-IMP: POSITIVE — SIGNIFICANT CHANGE UP
WBC # BLD: 10.67 K/UL — HIGH (ref 3.8–10.5)
WBC # FLD AUTO: 10.67 K/UL — HIGH (ref 3.8–10.5)

## 2023-01-12 NOTE — H&P PST ADULT - PROBLEM SELECTOR PLAN 1
Schedule for cerclage placement on 01/17/23. Pre op instructions, given and explained. Pt verbalized understanding.  Covid19 PCR ordered

## 2023-01-12 NOTE — H&P PST ADULT - NEGATIVE ENMT SYMPTOMS
no vertigo/no sinus symptoms/no post-nasal discharge/no nose bleeds/no recurrent cold sores/no abnormal taste sensation/no throat pain/no dysphagia

## 2023-01-12 NOTE — H&P PST ADULT - NSICDXPASTSURGICALHX_GEN_ALL_CORE_FT
PAST SURGICAL HISTORY:  History of appendectomy     S/P  2019     PAST SURGICAL HISTORY:  H/O umbilical hernia repair     History of appendectomy     S/P  2019

## 2023-01-12 NOTE — H&P PST ADULT - HISTORY OF PRESENT ILLNESS
26 y/o F with no significant PMHx  16 weeks , , LMP 22, HOLLEY 23, h/o incompetent cervix, premie at 32 weeks   28 y/o F 16 weeks pregnant, , LMP 22, HOLLEY 23, with h/o incompetent cervix, as per pt, had a  section at 32 weeks in 2019. Pt is now scheduled for cerclage placement

## 2023-01-16 ENCOUNTER — OUTPATIENT (OUTPATIENT)
Dept: OUTPATIENT SERVICES | Facility: HOSPITAL | Age: 28
LOS: 1 days | End: 2023-01-16

## 2023-01-16 ENCOUNTER — TRANSCRIPTION ENCOUNTER (OUTPATIENT)
Age: 28
End: 2023-01-16

## 2023-01-16 DIAGNOSIS — Z98.891 HISTORY OF UTERINE SCAR FROM PREVIOUS SURGERY: Chronic | ICD-10-CM

## 2023-01-16 DIAGNOSIS — Z90.49 ACQUIRED ABSENCE OF OTHER SPECIFIED PARTS OF DIGESTIVE TRACT: Chronic | ICD-10-CM

## 2023-01-16 DIAGNOSIS — Z20.822 CONTACT WITH AND (SUSPECTED) EXPOSURE TO COVID-19: ICD-10-CM

## 2023-01-16 DIAGNOSIS — Z98.890 OTHER SPECIFIED POSTPROCEDURAL STATES: Chronic | ICD-10-CM

## 2023-01-16 LAB — SARS-COV-2 RNA SPEC QL NAA+PROBE: SIGNIFICANT CHANGE UP

## 2023-01-16 RX ORDER — SODIUM CHLORIDE 9 MG/ML
1000 INJECTION, SOLUTION INTRAVENOUS
Refills: 0 | Status: DISCONTINUED | OUTPATIENT
Start: 2023-01-17 | End: 2023-01-17

## 2023-01-17 ENCOUNTER — INPATIENT (INPATIENT)
Facility: HOSPITAL | Age: 28
LOS: 0 days | Discharge: HOME | End: 2023-01-17
Attending: OBSTETRICS & GYNECOLOGY | Admitting: OBSTETRICS & GYNECOLOGY
Payer: COMMERCIAL

## 2023-01-17 ENCOUNTER — TRANSCRIPTION ENCOUNTER (OUTPATIENT)
Age: 28
End: 2023-01-17

## 2023-01-17 ENCOUNTER — INPATIENT (INPATIENT)
Facility: HOSPITAL | Age: 28
LOS: 0 days | Discharge: HOME | End: 2023-01-17
Attending: OBSTETRICS & GYNECOLOGY | Admitting: OBSTETRICS & GYNECOLOGY

## 2023-01-17 VITALS
DIASTOLIC BLOOD PRESSURE: 49 MMHG | OXYGEN SATURATION: 98 % | SYSTOLIC BLOOD PRESSURE: 104 MMHG | RESPIRATION RATE: 20 BRPM | HEART RATE: 91 BPM

## 2023-01-17 VITALS
SYSTOLIC BLOOD PRESSURE: 115 MMHG | DIASTOLIC BLOOD PRESSURE: 67 MMHG | RESPIRATION RATE: 16 BRPM | TEMPERATURE: 98 F | WEIGHT: 186.95 LBS | HEART RATE: 85 BPM | HEIGHT: 66 IN

## 2023-01-17 VITALS
DIASTOLIC BLOOD PRESSURE: 67 MMHG | TEMPERATURE: 98 F | SYSTOLIC BLOOD PRESSURE: 115 MMHG | HEART RATE: 85 BPM | RESPIRATION RATE: 16 BRPM

## 2023-01-17 VITALS
DIASTOLIC BLOOD PRESSURE: 67 MMHG | SYSTOLIC BLOOD PRESSURE: 115 MMHG | WEIGHT: 186.95 LBS | TEMPERATURE: 98 F | HEIGHT: 66 IN | RESPIRATION RATE: 16 BRPM | HEART RATE: 85 BPM

## 2023-01-17 DIAGNOSIS — O26.872 CERVICAL SHORTENING, SECOND TRIMESTER: ICD-10-CM

## 2023-01-17 DIAGNOSIS — Z98.891 HISTORY OF UTERINE SCAR FROM PREVIOUS SURGERY: Chronic | ICD-10-CM

## 2023-01-17 DIAGNOSIS — O09.299 SUPERVISION OF PREGNANCY WITH OTHER POOR REPRODUCTIVE OR OBSTETRIC HISTORY, UNSPECIFIED TRIMESTER: ICD-10-CM

## 2023-01-17 DIAGNOSIS — Z90.49 ACQUIRED ABSENCE OF OTHER SPECIFIED PARTS OF DIGESTIVE TRACT: Chronic | ICD-10-CM

## 2023-01-17 DIAGNOSIS — Z98.890 OTHER SPECIFIED POSTPROCEDURAL STATES: Chronic | ICD-10-CM

## 2023-01-17 LAB
BLD GP AB SCN SERPL QL: NEGATIVE — SIGNIFICANT CHANGE UP
HCT VFR BLD CALC: 37.7 % — SIGNIFICANT CHANGE UP (ref 34.5–45)
HGB BLD-MCNC: 12.1 G/DL — SIGNIFICANT CHANGE UP (ref 11.5–15.5)
MCHC RBC-ENTMCNC: 26.1 PG — LOW (ref 27–34)
MCHC RBC-ENTMCNC: 32.1 GM/DL — SIGNIFICANT CHANGE UP (ref 32–36)
MCV RBC AUTO: 81.4 FL — SIGNIFICANT CHANGE UP (ref 80–100)
NRBC # BLD: 0 /100 WBCS — SIGNIFICANT CHANGE UP (ref 0–0)
NRBC # FLD: 0 K/UL — SIGNIFICANT CHANGE UP (ref 0–0)
PLATELET # BLD AUTO: 234 K/UL — SIGNIFICANT CHANGE UP (ref 150–400)
RBC # BLD: 4.63 M/UL — SIGNIFICANT CHANGE UP (ref 3.8–5.2)
RBC # FLD: 14.6 % — HIGH (ref 10.3–14.5)
RH IG SCN BLD-IMP: POSITIVE — SIGNIFICANT CHANGE UP
WBC # BLD: 8.1 K/UL — SIGNIFICANT CHANGE UP (ref 3.8–10.5)
WBC # FLD AUTO: 8.1 K/UL — SIGNIFICANT CHANGE UP (ref 3.8–10.5)

## 2023-01-17 PROCEDURE — 59320 REVISION OF CERVIX: CPT | Mod: GC

## 2023-01-17 RX ORDER — METOCLOPRAMIDE HCL 10 MG
10 TABLET ORAL ONCE
Refills: 0 | Status: DISCONTINUED | OUTPATIENT
Start: 2023-01-17 | End: 2023-01-17

## 2023-01-17 RX ORDER — ONDANSETRON 8 MG/1
4 TABLET, FILM COATED ORAL ONCE
Refills: 0 | Status: COMPLETED | OUTPATIENT
Start: 2023-01-17 | End: 2023-01-17

## 2023-01-17 RX ORDER — FAMOTIDINE 10 MG/ML
20 INJECTION INTRAVENOUS ONCE
Refills: 0 | Status: COMPLETED | OUTPATIENT
Start: 2023-01-17 | End: 2023-01-17

## 2023-01-17 RX ORDER — SODIUM CHLORIDE 9 MG/ML
1000 INJECTION, SOLUTION INTRAVENOUS ONCE
Refills: 0 | Status: ACTIVE | OUTPATIENT
Start: 2023-01-17 | End: 2023-01-17

## 2023-01-17 RX ORDER — CITRIC ACID/SODIUM CITRATE 300-500 MG
30 SOLUTION, ORAL ORAL ONCE
Refills: 0 | Status: COMPLETED | OUTPATIENT
Start: 2023-01-17 | End: 2023-01-17

## 2023-01-17 RX ORDER — INDOMETHACIN 50 MG
50 CAPSULE ORAL ONCE
Refills: 0 | Status: COMPLETED | OUTPATIENT
Start: 2023-01-17 | End: 2023-01-17

## 2023-01-17 RX ORDER — INDOMETHACIN 50 MG
1 CAPSULE ORAL
Qty: 8 | Refills: 0
Start: 2023-01-17

## 2023-01-17 RX ORDER — ACETAMINOPHEN 500 MG
1000 TABLET ORAL ONCE
Refills: 0 | Status: COMPLETED | OUTPATIENT
Start: 2023-01-17 | End: 2023-01-17

## 2023-01-17 RX ORDER — SODIUM CHLORIDE 9 MG/ML
1000 INJECTION, SOLUTION INTRAVENOUS ONCE
Refills: 0 | Status: COMPLETED | OUTPATIENT
Start: 2023-01-17 | End: 2023-01-17

## 2023-01-17 RX ORDER — INDOMETHACIN 50 MG
25 CAPSULE ORAL EVERY 6 HOURS
Refills: 0 | Status: DISCONTINUED | OUTPATIENT
Start: 2023-01-17 | End: 2023-01-17

## 2023-01-17 RX ORDER — SODIUM CHLORIDE 9 MG/ML
1000 INJECTION, SOLUTION INTRAVENOUS
Refills: 0 | Status: ACTIVE | OUTPATIENT
Start: 2023-01-17 | End: 2023-12-16

## 2023-01-17 RX ADMIN — Medication 50 MILLIGRAM(S): at 08:25

## 2023-01-17 RX ADMIN — Medication 50 MILLIGRAM(S): at 07:55

## 2023-01-17 RX ADMIN — Medication 1000 MILLIGRAM(S): at 13:11

## 2023-01-17 RX ADMIN — SODIUM CHLORIDE 125 MILLILITER(S): 9 INJECTION, SOLUTION INTRAVENOUS at 08:01

## 2023-01-17 RX ADMIN — FAMOTIDINE 20 MILLIGRAM(S): 10 INJECTION INTRAVENOUS at 07:55

## 2023-01-17 RX ADMIN — ONDANSETRON 4 MILLIGRAM(S): 8 TABLET, FILM COATED ORAL at 10:42

## 2023-01-17 RX ADMIN — Medication 400 MILLIGRAM(S): at 10:43

## 2023-01-17 RX ADMIN — Medication 30 MILLILITER(S): at 08:20

## 2023-01-17 RX ADMIN — SODIUM CHLORIDE 1000 MILLILITER(S): 9 INJECTION, SOLUTION INTRAVENOUS at 07:00

## 2023-01-17 NOTE — OB PROVIDER H&P - NSHPPHYSICALEXAM_GEN_ALL_CORE
Vitals: ICU Vital Signs Last 24 Hrs  T(C): 36.8 (17 Jan 2023 07:04), Max: 36.8 (17 Jan 2023 07:00)  T(F): 98.2 (17 Jan 2023 07:04), Max: 98.24 (17 Jan 2023 07:00)  HR: 85 (17 Jan 2023 07:04) (85 - 85)  BP: 115/67 (17 Jan 2023 07:04) (115/67 - 115/67)  BP(mean): --  ABP: --  ABP(mean): --  RR: 16 (17 Jan 2023 07:04) (16 - 16)  SpO2: --      General: A&O x3  Heart: RRR, S1 & S2  Lungs: CTA b/l, good inspiratory/expiratory effort. No ronchi, no rales  Abdomen: Soft, Gravid, TOCO in place, +pfannenstial scar    Bedside Transabdominal US: cephalic position, posterior placenta, +FHM,     Extremities: FROM, bilateral 1+ LE edema  Neuro: grossly intact

## 2023-01-17 NOTE — OB RN PATIENT PROFILE - FUNCTIONAL ASSESSMENT - BASIC MOBILITY 4.
Father  Still living? Unknown  Family history of coronary artery disease, Age at diagnosis: Age Unknown 4 = No assist / stand by assistance

## 2023-01-17 NOTE — OB PROVIDER H&P - NS_OBGYNHISTORY_OBGYN_ALL_OB_FT
: Mar/, pLTCS 2/2 PPROM @32 weeks with cervical change, Breech presentation  G2: current pregnancy    Gyn Hx: Denies fibroids, ovarian cysts, abnormal pap smears

## 2023-01-17 NOTE — OB PROVIDER H&P - NSLOWPPHRISK_OBGYN_A_OB
Roger Pregnancy/Less than or equal to 4 previous vaginal births/No known bleeding disorder/No history of postpartum hemorrhage

## 2023-01-17 NOTE — OB PROVIDER H&P - NSHPSOCIALHISTORY_GEN_ALL_CORE
Lives at home with fiance, feels safe. Denies alcohol/tobacco/drug use during and before pregnancy. Denies GC/Chlam/HIV/herpes/STIs.

## 2023-01-17 NOTE — OB RN PATIENT PROFILE - FALL HARM RISK - UNIVERSAL INTERVENTIONS
Bed in lowest position, wheels locked, appropriate side rails in place/Call bell, personal items and telephone in reach/Instruct patient to call for assistance before getting out of bed or chair/Non-slip footwear when patient is out of bed/[place] to call system/Physically safe environment - no spills, clutter or unnecessary equipment/Purposeful Proactive Rounding/Room/bathroom lighting operational, light cord in reach

## 2023-01-17 NOTE — BRIEF OPERATIVE NOTE - NSICDXBRIEFPROCEDURE_GEN_ALL_CORE_FT
PROCEDURES:  Rodriguez cerclage of cervix during pregnancy by vaginal approach 17-Jan-2023 11:53:41  Mary Ellen Thomson

## 2023-01-17 NOTE — OB PROVIDER H&P - HISTORY OF PRESENT ILLNESS
28yo female  HOLLEY 23 presents @16w5d for scheduled cerclage d/t h/o PTL @32 weeks & current cervical length 2.5 cm. Denies SOB, fever, chills or cough. Negative COVID-19 test (23).   Denies VB, ROM or UCs today.     Antepartum Course: NIPT low risk, Nuchal translucency wnl, cervical length 23 2.5 cm  Med Hx: Denies asthma, HTN, DM, CAD, or other medical issues  Meds: PNV, denies other medications including prescribed/OTC   Allergies: Morphine (nausea & itching) NKEA, NKFA  Surgical Hx:   2017 appendectomy  2019 pLTCS  2022 Ventral hernia repair

## 2023-01-17 NOTE — PACU DISCHARGE NOTE - NSCLINEINSERTRD_GEN_ALL_CORE
Your opinion matters! Thank you for choosing the [facility]. You might receive a survey in the mail about your experience today to evaluate our clinic. Please fill it out and return it in the pre-paid envelope.  It was a pleasure to care for you today!     Test Results:  Our goal is to report all test results ordered by our care provider within 7-14 days. If you do not receive your test results either by phone, myaurora or by mail within 14 days after your visit with our office please call our office at [phone] and ask to speak with a member of our care team.      We at [facility] value the time and needs of our patients.  To help ensure we can meet the needs of all our patients here at the [facility], we will consider patients arriving ten minutes after their scheduled appointment time as tardy and will kassidy that appointment time as a no-show.  Please note that we recognize the events that happen in life and notice of a no-show will be sent to the patient.  It is when patients with three no-show appointments within a calendar year occur, that review for possible dismissal from the clinic will follow.      Start benefiber daily with 64 oz water daily  If blood in stool continues to see GI in follow up  Last colonoscopy 3/2021 normal  See podiatry for foot pain  Chiropractor for back pain  Lab work today   No

## 2023-01-17 NOTE — BRIEF OPERATIVE NOTE - NSICDXBRIEFPREOP_GEN_ALL_CORE_FT
PRE-OP DIAGNOSIS:  Cervical insufficiency in pregnancy, antepartum 17-Jan-2023 11:54:17  Mary Ellen Thomson

## 2023-01-17 NOTE — DISCHARGE NOTE ANTEPARTUM - PATIENT PORTAL LINK FT
You can access the FollowMyHealth Patient Portal offered by [facility] by registering at the following website: [email or web address]. By joining [facility]’s FollowMyHealth portal, you will also be able to view your health information using other applications (apps) compatible with our system.

## 2023-01-17 NOTE — DISCHARGE NOTE ANTEPARTUM - CARE PLAN
1 Principal Discharge DX:	Rodriguez cerclage present  Assessment and plan of treatment:	Please take Indomethacin every 6 hours for the next 2 days. Prescription was sent to [facility]. This is to prevent cramping after cerclage.  Vaginal spotting can be expected after cerclage placement.   Please follow up with your doctor within 1 week  Return to labor and delivery if you experience contractions, leakage of fluid, or vaginal bleeding like a period.

## 2023-01-17 NOTE — OB RN PATIENT PROFILE - NS_PRENATALLABSOURCEGBSBACTPN_OBGYN_ALL_OB
----- Message from Leandra Chan sent at 12/1/2022 10:04 AM CST -----  Contact: Abel (Spouse)  Requesting a call back regarding setting up MRI. Please call back at [phone]        unknown

## 2023-01-17 NOTE — BRIEF OPERATIVE NOTE - NSICDXBRIEFPOSTOP_GEN_ALL_CORE_FT
POST-OP DIAGNOSIS:  Cervical insufficiency in pregnancy, antepartum 17-Jan-2023 11:55:13  Mary Ellen Thomson

## 2023-01-17 NOTE — DISCHARGE NOTE ANTEPARTUM - HOSPITAL COURSE
28 y/o  @16w5d presented for ultrasound indicated cerclage. Rodriguez cerclage placed without any complications. Patient discharged home in stable condition.

## 2023-01-17 NOTE — DISCHARGE NOTE ANTEPARTUM - CARE PROVIDER_API CALL
April Menendez)  Obstetrics and Gynecology  [address] [place] [address]  Phone: [phone]  Fax: [phone]  Follow Up Time: 2 weeks

## 2023-01-17 NOTE — OB PROVIDER H&P - ASSESSMENT
Dx: Scheduled cerclage placement @ GA 16.5w  Dx: h/o PTL @ 32w & current cervical length 2.5 cm    - Admit to L&D  - NPO  - EFM/TOCO  - IV access, CBC/T&C/RPR  - Pepcid and Bicitra as per pre-op policy  - Indocin 50 mg preop   - Anesthesia consult   - Blood transfusion consent  - Operative Consent to be discussed and obtained by Dr. Cleary  - Plan to move to OR on time schedule  - Discussed with Dr. Madiha Cuellar, PAC

## 2023-01-17 NOTE — OB RN PREOPERATIVE CHECKLIST - SIDE RAILS UP
Pt calling because on 6/24 he had mantoux skin test administered, but he never came back to get test read.    Appt to administer mantoux scheduled for Wed at 9:30am.   Appt to read mantoux scheduled for Friday at 11am.     Rosa, are you able to re-order test for pt?   done

## 2023-01-17 NOTE — DISCHARGE NOTE ANTEPARTUM - MEDICATION SUMMARY - MEDICATIONS TO TAKE
I will START or STAY ON the medications listed below when I get home from the hospital:    indomethacin 25 mg oral capsule  -- 1 cap(s) by mouth every 6 hours   -- Do not take aspirin or aspirin containing products without knowledge and consent of your physician.  It is very important that you take or use this exactly as directed.  Do not skip doses or discontinue unless directed by your doctor.  May cause drowsiness or dizziness.  Obtain medical advice before taking any non-prescription drugs as some may affect the action of this medication.  Take with food or milk.    -- Indication: For for painful cramping    ondansetron 8 mg oral tablet  -- 1 tab(s) by mouth 3 times a day  -- Indication: For Home     Vitamin D3  -- 1 cap(s) by mouth once a week  -- Indication: For Home

## 2023-01-17 NOTE — DISCHARGE NOTE ANTEPARTUM - PLAN OF CARE
Please take Indomethacin every 6 hours for the next 2 days. Prescription was sent to [facility]. This is to prevent cramping after cerclage.  Vaginal spotting can be expected after cerclage placement.   Please follow up with your doctor within 1 week  Return to labor and delivery if you experience contractions, leakage of fluid, or vaginal bleeding like a period.

## 2023-01-17 NOTE — DISCHARGE NOTE ANTEPARTUM - NS MD DC FALL RISK RISK
For information on Fall & Injury Prevention, visit: [email or web address] OR  [email or web address] OR  https://www.cdc.gov/steadi/patient.html

## 2023-01-25 ENCOUNTER — ASOB RESULT (OUTPATIENT)
Age: 28
End: 2023-01-25

## 2023-01-25 ENCOUNTER — APPOINTMENT (OUTPATIENT)
Dept: ANTEPARTUM | Facility: CLINIC | Age: 28
End: 2023-01-25
Payer: COMMERCIAL

## 2023-01-25 PROCEDURE — 76817 TRANSVAGINAL US OBSTETRIC: CPT

## 2023-01-25 PROCEDURE — 76815 OB US LIMITED FETUS(S): CPT

## 2023-02-08 ENCOUNTER — ASOB RESULT (OUTPATIENT)
Age: 28
End: 2023-02-08

## 2023-02-08 ENCOUNTER — APPOINTMENT (OUTPATIENT)
Dept: ANTEPARTUM | Facility: CLINIC | Age: 28
End: 2023-02-08
Payer: COMMERCIAL

## 2023-02-08 PROCEDURE — 76817 TRANSVAGINAL US OBSTETRIC: CPT

## 2023-02-08 PROCEDURE — 76811 OB US DETAILED SNGL FETUS: CPT | Mod: 59

## 2023-02-10 NOTE — ED ADULT NURSE NOTE - PAIN: PRESENCE, MLM
Prescriptions was sent to wrong pharmacy medications is pending with correct pharmacy. RX pending. Please Advise. complains of pain/discomfort

## 2023-02-26 ENCOUNTER — OUTPATIENT (OUTPATIENT)
Dept: INPATIENT UNIT | Facility: HOSPITAL | Age: 28
LOS: 1 days | Discharge: HOME | End: 2023-02-26
Payer: COMMERCIAL

## 2023-02-26 VITALS
HEART RATE: 100 BPM | RESPIRATION RATE: 16 BRPM | TEMPERATURE: 99 F | DIASTOLIC BLOOD PRESSURE: 65 MMHG | SYSTOLIC BLOOD PRESSURE: 112 MMHG

## 2023-02-26 VITALS — DIASTOLIC BLOOD PRESSURE: 58 MMHG | HEART RATE: 81 BPM | SYSTOLIC BLOOD PRESSURE: 105 MMHG

## 2023-02-26 DIAGNOSIS — Z90.49 ACQUIRED ABSENCE OF OTHER SPECIFIED PARTS OF DIGESTIVE TRACT: Chronic | ICD-10-CM

## 2023-02-26 DIAGNOSIS — Z98.891 HISTORY OF UTERINE SCAR FROM PREVIOUS SURGERY: Chronic | ICD-10-CM

## 2023-02-26 DIAGNOSIS — O26.899 OTHER SPECIFIED PREGNANCY RELATED CONDITIONS, UNSPECIFIED TRIMESTER: ICD-10-CM

## 2023-02-26 DIAGNOSIS — Z98.890 OTHER SPECIFIED POSTPROCEDURAL STATES: Chronic | ICD-10-CM

## 2023-02-26 LAB
APPEARANCE UR: CLEAR — SIGNIFICANT CHANGE UP
BACTERIA # UR AUTO: ABNORMAL
BILIRUB UR-MCNC: NEGATIVE — SIGNIFICANT CHANGE UP
COLOR SPEC: YELLOW — SIGNIFICANT CHANGE UP
DIFF PNL FLD: NEGATIVE — SIGNIFICANT CHANGE UP
EPI CELLS # UR: 5 /HPF — SIGNIFICANT CHANGE UP (ref 0–5)
GLUCOSE UR QL: NEGATIVE — SIGNIFICANT CHANGE UP
KETONES UR-MCNC: ABNORMAL
LEUKOCYTE ESTERASE UR-ACNC: NEGATIVE — SIGNIFICANT CHANGE UP
NITRITE UR-MCNC: NEGATIVE — SIGNIFICANT CHANGE UP
PH UR: 6.5 — SIGNIFICANT CHANGE UP (ref 5–8)
PROT UR-MCNC: ABNORMAL
RBC CASTS # UR COMP ASSIST: 1 /HPF — SIGNIFICANT CHANGE UP (ref 0–4)
SP GR SPEC: 1.03 — SIGNIFICANT CHANGE UP (ref 1.01–1.05)
UROBILINOGEN FLD QL: ABNORMAL
WBC UR QL: 1 /HPF — SIGNIFICANT CHANGE UP (ref 0–5)

## 2023-02-26 PROCEDURE — 99221 1ST HOSP IP/OBS SF/LOW 40: CPT

## 2023-02-26 RX ORDER — ONDANSETRON 8 MG/1
1 TABLET, FILM COATED ORAL
Qty: 0 | Refills: 0 | DISCHARGE

## 2023-02-26 NOTE — OB PROVIDER TRIAGE NOTE - NSHPLABSRESULTS_GEN_ALL_CORE
Urinalysis Basic - ( 2023 22:25 )    Color: Yellow / Appearance: Clear / S.027 / pH: x  Gluc: x / Ketone: Trace  / Bili: Negative / Urobili: 3 mg/dL   Blood: x / Protein: 30 mg/dL / Nitrite: Negative   Leuk Esterase: Negative / RBC: 1 /HPF / WBC 1 /HPF   Sq Epi: x / Non Sq Epi: 5 /HPF / Bacteria: Few

## 2023-02-26 NOTE — OB PROVIDER TRIAGE NOTE - HISTORY OF PRESENT ILLNESS
27y  LMP 22 at 22w3d s/p Rodriguez cerclage on 23, presents to triage c/olower abdominal pressure and lower back pain which have resolved once in triage.   Presently patient has no complaints. Denies any urinary symptoms. Denies any h/o fibroids.  Reports +FM, no vaginal bleeding, no ROM or LOF  Prenatal care: [facility], complicated by cervical insufficiency s/p cerclage placement. Pt also has h/o PPROM and C/section delivery at 32wks. Pt is considering TOLAC    GYN: Denies any h/o STDs, fibroids, ovarian Cyst, or abnormal pap smear  OBH: 3/23/19 32wks C/s 4#5, PPROM, PTL, Breech presentation   PAST MEDICAL: Denies  PAST SURGICAL HISTORY:  -2017 Laparoscopic appendectomy  -2019 C/section  -Umbilical Hernia repair  Allergies  -morphine (Nausea)

## 2023-02-26 NOTE — OB PROVIDER TRIAGE NOTE - ADDITIONAL INSTRUCTIONS
Discharge home with instructions   labor precautions  Pt to monitor fetal kick counts  Pt to follow up with OB as scheduled  Pt to return to triage if having strong contractions, LOF, decreased fetal movements or vaginal bleeding

## 2023-02-26 NOTE — OB PROVIDER TRIAGE NOTE - NSOBPROVIDERNOTE_OBGYN_ALL_OB_FT
27y  LMP 22 at 22w3d s/p Rodriguez cerclage on 23.  No evidence of  labor  UA sent  Bedside TAS and TVS done  D/w  Sample  Discharge home with instructions   labor precautions  Pt to monitor fetal kick counts  Pt to follow up with OB as scheduled  Pt to return to triage if having strong contractions, LOF, decreased fetal movements or vaginal bleeding

## 2023-02-26 NOTE — OB PROVIDER TRIAGE NOTE - NSHPPHYSICALEXAM_GEN_ALL_CORE
T(C): 36.9 (02-26-23 @ 21:32), Max: 37.1 (02-26-23 @ 21:20)  HR: 87 (02-26-23 @ 21:35) (87 - 100)  BP: 115/68 (02-26-23 @ 21:35) (112/65 - 115/68)  RR: 16 (02-26-23 @ 21:20) (16 - 16)    Heart: RRR  Lungs: CTA  Abdomen: Gravid, soft, NT    [place]: No contractions  TAS: SLIUP, Transverse presentation, post placenta, JANAY: MVP: 5.45, +FM, +FH @ 150bpm  TVS: Cervix 4cm long, no funneling, no dynamic changes  VE: Long/closed, no tension felt on Stitch

## 2023-02-27 DIAGNOSIS — O34.211 MATERNAL CARE FOR LOW TRANSVERSE SCAR FROM PREVIOUS CESAREAN DELIVERY: ICD-10-CM

## 2023-02-27 DIAGNOSIS — M54.50 LOW BACK PAIN, UNSPECIFIED: ICD-10-CM

## 2023-02-27 DIAGNOSIS — Z3A.22 22 WEEKS GESTATION OF PREGNANCY: ICD-10-CM

## 2023-02-27 DIAGNOSIS — O99.891 OTHER SPECIFIED DISEASES AND CONDITIONS COMPLICATING PREGNANCY: ICD-10-CM

## 2023-02-27 DIAGNOSIS — R10.30 LOWER ABDOMINAL PAIN, UNSPECIFIED: ICD-10-CM

## 2023-02-27 DIAGNOSIS — O09.212 SUPERVISION OF PREGNANCY WITH HISTORY OF PRE-TERM LABOR, SECOND TRIMESTER: ICD-10-CM

## 2023-02-27 DIAGNOSIS — O26.892 OTHER SPECIFIED PREGNANCY RELATED CONDITIONS, SECOND TRIMESTER: ICD-10-CM

## 2023-02-27 DIAGNOSIS — O34.33 MATERNAL CARE FOR CERVICAL INCOMPETENCE, THIRD TRIMESTER: ICD-10-CM

## 2023-02-27 DIAGNOSIS — Z87.59 PERSONAL HISTORY OF OTHER COMPLICATIONS OF PREGNANCY, CHILDBIRTH AND THE PUERPERIUM: ICD-10-CM

## 2023-03-27 ENCOUNTER — NON-APPOINTMENT (OUTPATIENT)
Age: 28
End: 2023-03-27

## 2023-04-18 ENCOUNTER — APPOINTMENT (OUTPATIENT)
Dept: ANTEPARTUM | Facility: CLINIC | Age: 28
End: 2023-04-18
Payer: COMMERCIAL

## 2023-04-18 ENCOUNTER — OUTPATIENT (OUTPATIENT)
Dept: INPATIENT UNIT | Facility: HOSPITAL | Age: 28
LOS: 1 days | Discharge: HOME | End: 2023-04-18
Payer: COMMERCIAL

## 2023-04-18 ENCOUNTER — ASOB RESULT (OUTPATIENT)
Age: 28
End: 2023-04-18

## 2023-04-18 VITALS
DIASTOLIC BLOOD PRESSURE: 60 MMHG | SYSTOLIC BLOOD PRESSURE: 123 MMHG | HEART RATE: 97 BPM | TEMPERATURE: 98 F | RESPIRATION RATE: 14 BRPM

## 2023-04-18 VITALS — SYSTOLIC BLOOD PRESSURE: 108 MMHG | DIASTOLIC BLOOD PRESSURE: 69 MMHG | HEART RATE: 86 BPM

## 2023-04-18 DIAGNOSIS — Z98.891 HISTORY OF UTERINE SCAR FROM PREVIOUS SURGERY: Chronic | ICD-10-CM

## 2023-04-18 DIAGNOSIS — Z98.890 OTHER SPECIFIED POSTPROCEDURAL STATES: Chronic | ICD-10-CM

## 2023-04-18 DIAGNOSIS — Z90.49 ACQUIRED ABSENCE OF OTHER SPECIFIED PARTS OF DIGESTIVE TRACT: Chronic | ICD-10-CM

## 2023-04-18 DIAGNOSIS — O26.899 OTHER SPECIFIED PREGNANCY RELATED CONDITIONS, UNSPECIFIED TRIMESTER: ICD-10-CM

## 2023-04-18 LAB
AMNISURE ROM (RUPTURE OF MEMBRANES): NEGATIVE — SIGNIFICANT CHANGE UP
APPEARANCE UR: CLEAR — SIGNIFICANT CHANGE UP
BACTERIA # UR AUTO: ABNORMAL
BILIRUB UR-MCNC: NEGATIVE — SIGNIFICANT CHANGE UP
COLOR SPEC: YELLOW — SIGNIFICANT CHANGE UP
COMMENT - URINE: SIGNIFICANT CHANGE UP
DIFF PNL FLD: NEGATIVE — SIGNIFICANT CHANGE UP
EPI CELLS # UR: 5 /HPF — SIGNIFICANT CHANGE UP (ref 0–5)
GLUCOSE UR QL: NEGATIVE — SIGNIFICANT CHANGE UP
HYALINE CASTS # UR AUTO: 7 /LPF — SIGNIFICANT CHANGE UP (ref 0–7)
KETONES UR-MCNC: NEGATIVE — SIGNIFICANT CHANGE UP
LEUKOCYTE ESTERASE UR-ACNC: NEGATIVE — SIGNIFICANT CHANGE UP
NITRITE UR-MCNC: NEGATIVE — SIGNIFICANT CHANGE UP
PH UR: 7 — SIGNIFICANT CHANGE UP (ref 5–8)
PROT UR-MCNC: ABNORMAL
RBC CASTS # UR COMP ASSIST: 10 /HPF — HIGH (ref 0–4)
SP GR SPEC: 1.03 — SIGNIFICANT CHANGE UP (ref 1.01–1.05)
UROBILINOGEN FLD QL: SIGNIFICANT CHANGE UP
WBC UR QL: 2 /HPF — SIGNIFICANT CHANGE UP (ref 0–5)

## 2023-04-18 PROCEDURE — 99221 1ST HOSP IP/OBS SF/LOW 40: CPT | Mod: 25

## 2023-04-18 PROCEDURE — 76817 TRANSVAGINAL US OBSTETRIC: CPT | Mod: 26

## 2023-04-18 PROCEDURE — 83986 ASSAY PH BODY FLUID NOS: CPT | Mod: QW

## 2023-04-18 PROCEDURE — 76815 OB US LIMITED FETUS(S): CPT | Mod: 26

## 2023-04-18 PROCEDURE — 76819 FETAL BIOPHYS PROFIL W/O NST: CPT | Mod: 26

## 2023-04-18 NOTE — OB PROVIDER TRIAGE NOTE - NSOBPROVIDERNOTE_OBGYN_ALL_OB_FT
amnisure, u/a sent amnisure, u/a sent    amnisure negative    This is a 27 year old  at 29.5 weeks gestational age presented to r/o pprom  no evidence of pprom/ ptl  no evidence of UTI or dehydration  plan discussed with dr price   labor precuations reviewed with patient  continue fetal kick counts, rest and activity as tolerated, increase PO fluid  follow up with [facility] as scheduled  pt verbalized understanding of instructions given  discharged home

## 2023-04-18 NOTE — OB RN TRIAGE NOTE - CURRENT PREGNANCY COMPLICATIONS, OB PROFILE
cerclage placed 16wks/Incompetent Cervix/Cervical Insufficiency cerclage placed 16wks/Incompetent Cervix/Cervical Insufficiency/Gestational Age less than 36 Weeks

## 2023-04-18 NOTE — OB PROVIDER TRIAGE NOTE - NSHPLABSRESULTS_GEN_ALL_CORE
Urinalysis Basic - ( 2023 13:46 )    Color: Yellow / Appearance: Clear / S.033 / pH: x  Gluc: x / Ketone: Negative  / Bili: Negative / Urobili: <2 mg/dL   Blood: x / Protein: 30 mg/dL / Nitrite: Negative   Leuk Esterase: Negative / RBC: 10 /HPF / WBC 2 /HPF   Sq Epi: x / Non Sq Epi: x / Bacteria: Few

## 2023-04-18 NOTE — OB PROVIDER TRIAGE NOTE - NSICDXFAMILYHX_GEN_ALL_CORE_FT
FAMILY HISTORY:  No pertinent family history in first degree relatives     Alar Island Pedicle Flap Text: The defect edges were debeveled with a #15 scalpel blade.  Given the location of the defect, shape of the defect and the proximity to the alar rim an island pedicle advancement flap was deemed most appropriate.  Using a sterile surgical marker, an appropriate advancement flap was drawn incorporating the defect, outlining the appropriate donor tissue and placing the expected incisions within the nasal ala running parallel to the alar rim. The area thus outlined was incised with a #15 scalpel blade.  The skin margins were undermined minimally to an appropriate distance in all directions around the primary defect and laterally outward around the island pedicle utilizing iris scissors.  There was minimal undermining beneath the pedicle flap.

## 2023-04-18 NOTE — OB PROVIDER TRIAGE NOTE - ADDITIONAL INSTRUCTIONS
This is a 27 year old  at 29.5 weeks gestational age presented to r/o pprom  no evidence of pprom/ ptl  no evidence of UTI or dehydration  plan discussed with dr price   labor precuations reviewed with patient  continue fetal kick counts, rest and activity as tolerated, increase PO fluid  follow up with [facility] as scheduled  pt verbalized understanding of instructions given  discharged home

## 2023-04-18 NOTE — OB RN TRIAGE NOTE - NS_DISCHARGEPRINT_OBGYN_ALL_OB
Pt advised of result  States she is feeling better and will complete abx    OB Discharge Instructions

## 2023-04-18 NOTE — OB PROVIDER TRIAGE NOTE - NSHPPHYSICALEXAM_GEN_ALL_CORE
Vital Signs Last 24 Hrs  T(C): 36.5 (18 Apr 2023 12:56), Max: 36.5 (18 Apr 2023 12:35)  T(F): 97.7 (18 Apr 2023 12:56), Max: 97.7 (18 Apr 2023 12:35)  HR: 86 (18 Apr 2023 13:19) (86 - 97)  BP: 108/69 (18 Apr 2023 13:19) (108/69 - 123/60)  BP(mean): --  RR: 17 (18 Apr 2023 12:56) (14 - 17)  SpO2: --    A&O x3  CTAB  abdomen: gravid, soft, nontender  TAS: vtx, fundal placenta, bpp 8/8, joey 14.7  SSE: cervix appears closed, cerclage in situ, no tension on stitch, no LOF, no VB  nitrazine negative, fern negative Vital Signs Last 24 Hrs  T(C): 36.5 (18 Apr 2023 12:56), Max: 36.5 (18 Apr 2023 12:35)  T(F): 97.7 (18 Apr 2023 12:56), Max: 97.7 (18 Apr 2023 12:35)  HR: 86 (18 Apr 2023 13:19) (86 - 97)  BP: 108/69 (18 Apr 2023 13:19) (108/69 - 123/60)  BP(mean): --  RR: 17 (18 Apr 2023 12:56) (14 - 17)  SpO2: --    A&O x3  CTAB  abdomen: gravid, soft, nontender  TAS: vtx, fundal placenta, bpp 8/8, joey 14.7  SSE: cervix appears closed, cerclage in situ, no tension on stitch, no LOF, no VB  nitrazine negative, fern negative  amnisure sent  TVS CL 3.6-4.0 no funneling no dynamic changes   images and report saved and uploaded in [facility]

## 2023-04-18 NOTE — OB PROVIDER TRIAGE NOTE - HISTORY OF PRESENT ILLNESS
This is a 27 year old patient of [facility]  at 29.5 weeks gestational age presents with complaints of trickling of fluid that started at 8 am, reporting still scant wetness on her underwear at this time. Reports +GFM, denies VB or contractions. Denies dysuria, hematuria, foul smell, urinary frequency or urinary symptoms.     AP course complicated by:  - hx of pprom/ PTL @ 32 weeks with pregnancy  - 23 Rivera cerclage placed, CL 2.5    HIE reviewed  23 ATU  Level II sonogram- cephalic, posterior no previa, mvp 3.15 efw 347g, TVS CL = 3.4 cm, cerclage in situ

## 2023-04-18 NOTE — OB RN TRIAGE NOTE - FALL HARM RISK - UNIVERSAL INTERVENTIONS
Bed in lowest position, wheels locked, appropriate side rails in place/Call bell, personal items and telephone in reach/Instruct patient to call for assistance before getting out of bed or chair/Non-slip footwear when patient is out of bed/Ord to call system/Physically safe environment - no spills, clutter or unnecessary equipment/Purposeful Proactive Rounding/Room/bathroom lighting operational, light cord in reach

## 2023-04-21 DIAGNOSIS — Z87.59 PERSONAL HISTORY OF OTHER COMPLICATIONS OF PREGNANCY, CHILDBIRTH AND THE PUERPERIUM: ICD-10-CM

## 2023-04-21 DIAGNOSIS — O34.33 MATERNAL CARE FOR CERVICAL INCOMPETENCE, THIRD TRIMESTER: ICD-10-CM

## 2023-04-21 DIAGNOSIS — O34.219 MATERNAL CARE FOR UNSPECIFIED TYPE SCAR FROM PREVIOUS CESAREAN DELIVERY: ICD-10-CM

## 2023-04-21 DIAGNOSIS — O09.211 SUPERVISION OF PREGNANCY WITH HISTORY OF PRE-TERM LABOR, FIRST TRIMESTER: ICD-10-CM

## 2023-04-21 DIAGNOSIS — Z3A.29 29 WEEKS GESTATION OF PREGNANCY: ICD-10-CM

## 2023-04-21 DIAGNOSIS — Z03.71 ENCOUNTER FOR SUSPECTED PROBLEM WITH AMNIOTIC CAVITY AND MEMBRANE RULED OUT: ICD-10-CM

## 2023-05-22 ENCOUNTER — INPATIENT (INPATIENT)
Facility: HOSPITAL | Age: 28
LOS: 1 days | Discharge: HOME | End: 2023-05-24
Attending: STUDENT IN AN ORGANIZED HEALTH CARE EDUCATION/TRAINING PROGRAM | Admitting: STUDENT IN AN ORGANIZED HEALTH CARE EDUCATION/TRAINING PROGRAM

## 2023-05-22 VITALS
RESPIRATION RATE: 14 BRPM | TEMPERATURE: 98 F | SYSTOLIC BLOOD PRESSURE: 114 MMHG | OXYGEN SATURATION: 99 % | HEART RATE: 90 BPM | DIASTOLIC BLOOD PRESSURE: 73 MMHG

## 2023-05-22 DIAGNOSIS — Z98.890 OTHER SPECIFIED POSTPROCEDURAL STATES: Chronic | ICD-10-CM

## 2023-05-22 DIAGNOSIS — O47.00 FALSE LABOR BEFORE 37 COMPLETED WEEKS OF GESTATION, UNSPECIFIED TRIMESTER: ICD-10-CM

## 2023-05-22 DIAGNOSIS — Z98.891 HISTORY OF UTERINE SCAR FROM PREVIOUS SURGERY: Chronic | ICD-10-CM

## 2023-05-22 DIAGNOSIS — Z90.49 ACQUIRED ABSENCE OF OTHER SPECIFIED PARTS OF DIGESTIVE TRACT: Chronic | ICD-10-CM

## 2023-05-22 DIAGNOSIS — O26.899 OTHER SPECIFIED PREGNANCY RELATED CONDITIONS, UNSPECIFIED TRIMESTER: ICD-10-CM

## 2023-05-22 LAB
APPEARANCE UR: CLEAR — SIGNIFICANT CHANGE UP
BILIRUB UR-MCNC: NEGATIVE — SIGNIFICANT CHANGE UP
BLD GP AB SCN SERPL QL: NEGATIVE — SIGNIFICANT CHANGE UP
COLOR SPEC: YELLOW — SIGNIFICANT CHANGE UP
DIFF PNL FLD: NEGATIVE — SIGNIFICANT CHANGE UP
GLUCOSE UR QL: NEGATIVE — SIGNIFICANT CHANGE UP
KETONES UR-MCNC: NEGATIVE — SIGNIFICANT CHANGE UP
LEUKOCYTE ESTERASE UR-ACNC: NEGATIVE — SIGNIFICANT CHANGE UP
NITRITE UR-MCNC: NEGATIVE — SIGNIFICANT CHANGE UP
PH UR: 7.5 — SIGNIFICANT CHANGE UP (ref 5–8)
PROT UR-MCNC: ABNORMAL
RH IG SCN BLD-IMP: POSITIVE — SIGNIFICANT CHANGE UP
SP GR SPEC: 1.03 — SIGNIFICANT CHANGE UP (ref 1.01–1.05)
UROBILINOGEN FLD QL: SIGNIFICANT CHANGE UP

## 2023-05-22 RX ORDER — SODIUM CHLORIDE 9 MG/ML
1000 INJECTION, SOLUTION INTRAVENOUS
Refills: 0 | Status: DISCONTINUED | OUTPATIENT
Start: 2023-05-22 | End: 2023-05-23

## 2023-05-22 RX ORDER — ONDANSETRON 8 MG/1
4 TABLET, FILM COATED ORAL ONCE
Refills: 0 | Status: COMPLETED | OUTPATIENT
Start: 2023-05-22 | End: 2023-05-22

## 2023-05-22 RX ORDER — SODIUM CHLORIDE 9 MG/ML
1000 INJECTION, SOLUTION INTRAVENOUS ONCE
Refills: 0 | Status: COMPLETED | OUTPATIENT
Start: 2023-05-22 | End: 2023-05-22

## 2023-05-22 RX ORDER — OXYTOCIN 10 UNIT/ML
333.33 VIAL (ML) INJECTION
Qty: 20 | Refills: 0 | Status: DISCONTINUED | OUTPATIENT
Start: 2023-05-22 | End: 2023-05-22

## 2023-05-22 RX ORDER — ACETAMINOPHEN 500 MG
1000 TABLET ORAL ONCE
Refills: 0 | Status: COMPLETED | OUTPATIENT
Start: 2023-05-22 | End: 2023-05-22

## 2023-05-22 RX ORDER — ONDANSETRON 8 MG/1
0 TABLET, FILM COATED ORAL
Qty: 0 | Refills: 0 | DISCHARGE

## 2023-05-22 RX ORDER — SODIUM CHLORIDE 9 MG/ML
1000 INJECTION, SOLUTION INTRAVENOUS
Refills: 0 | Status: DISCONTINUED | OUTPATIENT
Start: 2023-05-22 | End: 2023-05-22

## 2023-05-22 RX ORDER — CHLORHEXIDINE GLUCONATE 213 G/1000ML
1 SOLUTION TOPICAL DAILY
Refills: 0 | Status: DISCONTINUED | OUTPATIENT
Start: 2023-05-22 | End: 2023-05-22

## 2023-05-22 RX ADMIN — Medication 12 MILLIGRAM(S): at 22:46

## 2023-05-22 RX ADMIN — CHLORHEXIDINE GLUCONATE 1 APPLICATION(S): 213 SOLUTION TOPICAL at 23:00

## 2023-05-22 RX ADMIN — Medication 400 MILLIGRAM(S): at 20:19

## 2023-05-22 RX ADMIN — Medication 1000 MILLIGRAM(S): at 20:35

## 2023-05-22 RX ADMIN — ONDANSETRON 4 MILLIGRAM(S): 8 TABLET, FILM COATED ORAL at 23:58

## 2023-05-22 RX ADMIN — SODIUM CHLORIDE 1000 MILLILITER(S): 9 INJECTION, SOLUTION INTRAVENOUS at 20:15

## 2023-05-22 NOTE — OB RN PATIENT PROFILE - BREAST MILK IS MORE DIGESTIBLE, MAKING VOMITING, DIARRHEA, GAS AND CONSTIPATION LESS COMMON
Nutrition Care Plan    Nutrition Diagnosis:   Increased nutrient needs  related to metastatic cancer and upcoming surgery as evidenced by estimated needs and unintentional weight loss despite patient report that his diet has not changed. Intervention:  General/healthful diet:   continue regular diet, encouraged patient to order 3x/day emphasizing protein options. --patient ordering 3 meals a day and consuming %    Commercial beverage:   continue chocolate standard oral supplement at HS snack, add vanilla standard oral to AM snack and strawberry standard oral to PM snack. --patient has been drinking at HS and agreeable to increase.     Monitoring and Evaluation:  Amount of food:   goal: patient to consume >75% of 3 meals and 100% of supplement daily (goal met).  -12/3 3 meals 100%, 12/4 3 meals % Statement Selected

## 2023-05-22 NOTE — OB PROVIDER H&P - LABOR: FETAL STATION
From: Too Cuevas  To: Luisito BARB Daniel  Sent: 10/25/2022 2:12 PM CDT  Subject: Medication Refill Clarification and Fix    Hi! I just wanted to follow up on my call from this morning about the meditation refill because I do not see that fix on the [facility] yaa.     The [facility] pharmacy will not fill the refill request for the 30mg adderall because of the note that says not to start taking the medication until 11/05/22, even though the last time this medication was filled was 09/26/22. I believe the date of 11/05 on the note sent over to the pharmacy is a mistake due to my other adderall prescription for 20mg that was last filled on 10/05/22.     I will just need a new prescription for the 30mg adderall sent over to the pharmacy sent over (as today is day 30 and I am out of the medication) because the pharmacy will not fill the medication with that note that was originally attached regarding 11/05.     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 Feel free to give me a call.     Thank you.    -3

## 2023-05-22 NOTE — OB PROVIDER TRIAGE NOTE - NS_OBGYNHISTORY_OBGYN_ALL_OB_FT
C/s breech  2019  cerclage placed @ 16wks with this pregnancy C/s breech  3/23/2019  cerclage placed @ 16wks with this pregnancy    Gyn: denies hx fibroids, abnormal pap smears, cysts, STDs, HSV

## 2023-05-22 NOTE — OB PROVIDER TRIAGE NOTE - NSOBPROVIDERNOTE_OBGYN_ALL_OB_FT
28 year old female  @ 34.4GA with SLIUP, PNC complicated by short cervix s/p cerclage, and prior c/s for breech, presents to triage c/o cramping since friday.    pt appears comfortbale, for the duration of examination/evaluation pt denies feeling contractions while I am at bedside  BPP 8/8, reactive NST, irregular contractions  0.5/30/-3  cerclage in place without tension visualized   UA pending  IV fluids  IV tylenol 28 year old female  @ 34.4GA with SLIUP, PNC complicated by short cervix s/p cerclage, and prior c/s for breech, presents to triage c/o cramping since friday.  surg hx: c/s , appendectomy , umb hernia ,     pt appears comfortbale, for the duration of examination/evaluation pt denies feeling contractions while I am at bedside  BPP 8/8, reactive NST, irregular contractions  0.5/30/-3  cerclage in place without tension visualized   UA pending  IV fluids  IV tylenol 28 year old female  @ 34.4GA with SLIUP, PNC complicated by short cervix s/p cerclage, and prior c/s for breech, presents to triage c/o cramping since friday.  surg hx: c/s , appendectomy , umb hernia ,     pt appears comfortbale, for the duration of examination/evaluation pt denies feeling contractions while I am at bedside  BPP 8/8, reactive NST, irregular contractions  0.5/30/-3  cerclage in place without tension visualized   UA neg for UTI results above - RBC, yeast like cells, protein   IV fluids  IV tylenol    @ 9:05pm d/w Dr Yan: complete fluid bolus and reassess pain and contraction frequency 28 year old female  @ 34.4GA with SLIUP, PNC complicated by short cervix s/p cerclage, and prior c/s for breech, presents to triage c/o cramping since friday.  surg hx: c/s , appendectomy , umb hernia ,     pt appears comfortbale, for the duration of examination/evaluation pt denies feeling contractions while I am at bedside  BPP 8/8, reactive NST, irregular contractions  0.5/30/-3  cerclage in place without tension visualized   UA neg for UTI results above - RBC, yeast like cells, protein   IV fluids  IV tylenol    @ 9:05pm d/w Dr Jade: complete fluid bolus and reassess pain and contraction frequency  @ 21:45 ot reports she still feels contractions rated at 8/10 in pain     dx:  contractions  @ 21.45 d/w Dr Jade: admit to antepartum for further monitoring and betamethasone  continuos fluids   ordered/sent admission labs  discussed consents with pt, pt verbalized understanding and signed  GBS neg  resident to monitor pt and if continues to have contractions, remove cerclage    d/w Dr Guthrie PGY 3, verbalized understanding

## 2023-05-22 NOTE — OB RN PATIENT PROFILE - HEALTH/HEALTHCARE ANXIETIES, PROFILE
.  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, on immunotherapy, who presen ALL:    Codeine                 NAUSEA AND VOMITING     Home Medications:    Outpatient Medications Marked as Taking for the 5/18/19 encounter [facility] Encounter):  Sulfamethoxazole-TMP -160 MG Oral Tab per tablet Take 1 tablet by mouth 3 (three and tone, no joint swelling  Neuro- A&OX3, no focal deficits      Diagnostic Data:    CBC/Chem  Recent Labs   Lab 05/14/19  1405 05/18/19  1146   WBC 12.29 5.3   HGB 13.2 15.0   MCV 93.7 92.4    150.0   BAND  --  6       Recent Labs   Lab 05/14/19 MEDIASTINUM:  Scattered nodes with a right low paratracheal node measuring 1.4 x 1.2 cm. Right paramediastinal soft tissue density measures 2.4 x 1.6 cm. MARICARMEN:  Right hilar soft tissue density. CARDIAC:  Mild Coronary artery calcifications.  PLEURA:  Tra VASCULATURE:  There is evidence of segmental and subsegmental pulmonary emboli involving bilateral lower lobe branches and lingular branches to the left upper lobe. No large central pulmonary emboli. THORACIC AORTA:  No aneurysm or visible dissection.   MODE Hospitalization - Initial Certification    Patient will require inpatient services that will reasonably be expected to span two midnight's based on the clinical documentation in H+P.    Based on patients current state of illness, I anticipate that, after [facility] none

## 2023-05-22 NOTE — OB PROVIDER H&P - PROBLEM SELECTOR PLAN 1
dx:  contractions  @ 21.45 d/w Dr Jade: admit to antepartum for further monitoring and betamethasone  continuos fluids   ordered/sent admission labs  discussed consents with pt, pt verbalized understanding and signed  GBS neg  resident to monitor pt and if continues to have contractions, remove cerclage    d/w Dr Guthrie PGY 3, verbalized understanding

## 2023-05-22 NOTE — OB PROVIDER TRIAGE NOTE - HISTORY OF PRESENT ILLNESS
28 year old female  @ 34.4GA with SLIUP, PNC complicated by short cervix s/p cerclage, and prior c/s for breech, presents to triage c/o cramping since friday. Reports pelvic pressure and intermittent  cramping described as tightening. occurs a few times per hour, /10 in pain, requests pain medication. Reports GFM. denies VB, LOF, HA/N/V/D/Fever/chills/vision changes.   PNC with Dr Menendez last visit 23, next visit 23.  desires TOLAC, repeat c/s scheduled for   EFW 23 4#  GBS negative  cerclage placed at 16GA due to hx  labor and cervical length 2cm    surg hx: appendectomy , umbilical hernia , c/s 2019

## 2023-05-22 NOTE — OB PROVIDER H&P - NSHPPHYSICALEXAM_GEN_ALL_CORE
Vital Signs Last 24 Hrs  T(C): 36.6 (22 May 2023 17:59), Max: 36.6 (22 May 2023 17:59)  T(F): 97.9 (22 May 2023 17:59), Max: 97.9 (22 May 2023 17:59)  HR: 85 (22 May 2023 20:01) (85 - 97)  BP: 113/64 (22 May 2023 20:01) (103/73 - 114/76)  BP(mean): --  RR: 14 (22 May 2023 17:59) (14 - 14)  SpO2: 99% (22 May 2023 17:59) (99% - 99%)    Parameters below as of 22 May 2023 17:59  Patient On (Oxygen Delivery Method): room air    gen: a/o x 3, appears in no apparent distress  pulm: breathing unlabored on room air  abd: soft and nontender, gravid  neg CVA tenderness  SSE: no evidence LOF/VB/pooling. cerclage visualized without any tension. FfN performed, held.   SVE: 0.5/30/-3 without evidence LOF/VB/pooling at external genitalia  TAS: vertex by sono. posterior placenta. JANAY 14.85. BPP 8/8. image saved  EFM: baseline 145 with mod variability, pos accels- reactive   TOCO: irregular contractions

## 2023-05-22 NOTE — OB PROVIDER H&P - ASSESSMENT
28 year old female  @ 34.4GA with SLIUP, PNC complicated by short cervix s/p cerclage, and prior c/s for breech, presents to triage c/o cramping since friday.  surg hx: c/s , appendectomy , umb hernia ,   desires TOLAC    pt appears comfortbale, for the duration of examination/evaluation pt denies feeling contractions while I am at bedside  BPP 8/8, reactive NST, irregular contractions  0.5/30/-3  cerclage in place without tension visualized   UA neg for UTI results above - RBC, yeast like cells, protein   IV fluids  IV tylenol    @ 9:05pm d/w Dr Jade: complete fluid bolus and reassess pain and contraction frequency  @ 21:45 ot reports she still feels contractions rated at 8/10 in pain     dx:  contractions  @ 21.45 d/w Dr Jade: admit to antepartum for further monitoring and betamethasone  continuos fluids   ordered/sent admission labs  discussed consents with pt, pt verbalized understanding and signed  GBS neg  resident to monitor pt and if continues to have contractions, remove cerclage    d/w Dr Guthrie PGY 3, verbalized understanding

## 2023-05-22 NOTE — OB PROVIDER TRIAGE NOTE - NSHPPHYSICALEXAM_GEN_ALL_CORE
Vital Signs Last 24 Hrs  T(C): 36.6 (22 May 2023 17:59), Max: 36.6 (22 May 2023 17:59)  T(F): 97.9 (22 May 2023 17:59), Max: 97.9 (22 May 2023 17:59)  HR: 85 (22 May 2023 20:01) (85 - 97)  BP: 113/64 (22 May 2023 20:01) (103/73 - 114/76)  BP(mean): --  RR: 14 (22 May 2023 17:59) (14 - 14)  SpO2: 99% (22 May 2023 17:59) (99% - 99%)    Parameters below as of 22 May 2023 17:59  Patient On (Oxygen Delivery Method): room air    gen: a/o x 3, appears in no apparent distress  pulm: breathing unlabored on room air  abd: soft and nontender, gravid  SSE: no evidence LOF/VB/pooling. cerclage visualized without any tension.  SVE: 0.5/30/-3 without evidenec LOF/VB/pooling at external genitalia  TAS: vertex by sono. posterior placenta. JANAY 14.85. BPP 8/8. image saved  EFM: baseline 145 with mod variability, pos accels- reactive   TOCO: irregular contractions Vital Signs Last 24 Hrs  T(C): 36.6 (22 May 2023 17:59), Max: 36.6 (22 May 2023 17:59)  T(F): 97.9 (22 May 2023 17:59), Max: 97.9 (22 May 2023 17:59)  HR: 85 (22 May 2023 20:01) (85 - 97)  BP: 113/64 (22 May 2023 20:01) (103/73 - 114/76)  BP(mean): --  RR: 14 (22 May 2023 17:59) (14 - 14)  SpO2: 99% (22 May 2023 17:59) (99% - 99%)    Parameters below as of 22 May 2023 17:59  Patient On (Oxygen Delivery Method): room air    gen: a/o x 3, appears in no apparent distress  pulm: breathing unlabored on room air  abd: soft and nontender, gravid  SSE: no evidence LOF/VB/pooling. cerclage visualized without any tension. FfN performed, held.   SVE: 0.5/30/-3 without evidence LOF/VB/pooling at external genitalia  TAS: vertex by sono. posterior placenta. JANAY 14.85. BPP 8/8. image saved  EFM: baseline 145 with mod variability, pos accels- reactive   TOCO: irregular contractions Vital Signs Last 24 Hrs  T(C): 36.6 (22 May 2023 17:59), Max: 36.6 (22 May 2023 17:59)  T(F): 97.9 (22 May 2023 17:59), Max: 97.9 (22 May 2023 17:59)  HR: 85 (22 May 2023 20:01) (85 - 97)  BP: 113/64 (22 May 2023 20:01) (103/73 - 114/76)  BP(mean): --  RR: 14 (22 May 2023 17:59) (14 - 14)  SpO2: 99% (22 May 2023 17:59) (99% - 99%)    Parameters below as of 22 May 2023 17:59  Patient On (Oxygen Delivery Method): room air    gen: a/o x 3, appears in no apparent distress  pulm: breathing unlabored on room air  abd: soft and nontender, gravid  neg CVA tenderness  SSE: no evidence LOF/VB/pooling. cerclage visualized without any tension. FfN performed, held.   SVE: 0.5/30/-3 without evidence LOF/VB/pooling at external genitalia  TAS: vertex by sono. posterior placenta. JANAY 14.85. BPP 8/8. image saved  EFM: baseline 145 with mod variability, pos accels- reactive   TOCO: irregular contractions

## 2023-05-22 NOTE — OB PROVIDER H&P - NS_OBGYNHISTORY_OBGYN_ALL_OB_FT
C/s breech  3/23/2019  cerclage placed @ 16wks with this pregnancy    Gyn: denies hx fibroids, abnormal pap smears, cysts, STDs, HSV

## 2023-05-22 NOTE — OB PROVIDER TRIAGE NOTE - NSHPLABSRESULTS_GEN_ALL_CORE
Urinalysis Basic - ( 22 May 2023 19:45 )    Color: Yellow / Appearance: Clear / S.026 / pH: x  Gluc: x / Ketone: Negative  / Bili: Negative / Urobili: <2 mg/dL   Blood: x / Protein: Trace / Nitrite: Negative   Leuk Esterase: Negative / RBC: x / WBC x   Sq Epi: x / Non Sq Epi: x / Bacteria: x

## 2023-05-23 ENCOUNTER — APPOINTMENT (OUTPATIENT)
Dept: ANTEPARTUM | Facility: CLINIC | Age: 28
End: 2023-05-23
Payer: COMMERCIAL

## 2023-05-23 ENCOUNTER — ASOB RESULT (OUTPATIENT)
Age: 28
End: 2023-05-23

## 2023-05-23 DIAGNOSIS — Z04.9 ENCOUNTER FOR EXAMINATION AND OBSERVATION FOR UNSPECIFIED REASON: ICD-10-CM

## 2023-05-23 LAB
BASOPHILS # BLD AUTO: 0.03 K/UL — SIGNIFICANT CHANGE UP (ref 0–0.2)
BASOPHILS NFR BLD AUTO: 0.3 % — SIGNIFICANT CHANGE UP (ref 0–2)
EOSINOPHIL # BLD AUTO: 0.07 K/UL — SIGNIFICANT CHANGE UP (ref 0–0.5)
EOSINOPHIL NFR BLD AUTO: 0.6 % — SIGNIFICANT CHANGE UP (ref 0–6)
HCT VFR BLD CALC: 34.9 % — SIGNIFICANT CHANGE UP (ref 34.5–45)
HGB BLD-MCNC: 10.8 G/DL — LOW (ref 11.5–15.5)
IANC: 7.34 K/UL — SIGNIFICANT CHANGE UP (ref 1.8–7.4)
IMM GRANULOCYTES NFR BLD AUTO: 0.4 % — SIGNIFICANT CHANGE UP (ref 0–0.9)
LYMPHOCYTES # BLD AUTO: 2.71 K/UL — SIGNIFICANT CHANGE UP (ref 1–3.3)
LYMPHOCYTES # BLD AUTO: 24.5 % — SIGNIFICANT CHANGE UP (ref 13–44)
MCHC RBC-ENTMCNC: 23.6 PG — LOW (ref 27–34)
MCHC RBC-ENTMCNC: 30.9 GM/DL — LOW (ref 32–36)
MCV RBC AUTO: 76.4 FL — LOW (ref 80–100)
MONOCYTES # BLD AUTO: 0.88 K/UL — SIGNIFICANT CHANGE UP (ref 0–0.9)
MONOCYTES NFR BLD AUTO: 7.9 % — SIGNIFICANT CHANGE UP (ref 2–14)
NEUTROPHILS # BLD AUTO: 7.34 K/UL — SIGNIFICANT CHANGE UP (ref 1.8–7.4)
NEUTROPHILS NFR BLD AUTO: 66.3 % — SIGNIFICANT CHANGE UP (ref 43–77)
NRBC # BLD: 0 /100 WBCS — SIGNIFICANT CHANGE UP (ref 0–0)
NRBC # FLD: 0 K/UL — SIGNIFICANT CHANGE UP (ref 0–0)
PLATELET # BLD AUTO: 253 K/UL — SIGNIFICANT CHANGE UP (ref 150–400)
RBC # BLD: 4.57 M/UL — SIGNIFICANT CHANGE UP (ref 3.8–5.2)
RBC # FLD: 14.9 % — HIGH (ref 10.3–14.5)
T PALLIDUM AB TITR SER: NEGATIVE — SIGNIFICANT CHANGE UP
WBC # BLD: 11.07 K/UL — HIGH (ref 3.8–10.5)
WBC # FLD AUTO: 11.07 K/UL — HIGH (ref 3.8–10.5)

## 2023-05-23 PROCEDURE — 76816 OB US FOLLOW-UP PER FETUS: CPT | Mod: 26

## 2023-05-23 PROCEDURE — 76819 FETAL BIOPHYS PROFIL W/O NST: CPT | Mod: 26,59

## 2023-05-23 RX ORDER — MORPHINE SULFATE 50 MG/1
4 CAPSULE, EXTENDED RELEASE ORAL ONCE
Refills: 0 | Status: DISCONTINUED | OUTPATIENT
Start: 2023-05-23 | End: 2023-05-23

## 2023-05-23 RX ORDER — ONDANSETRON 8 MG/1
4 TABLET, FILM COATED ORAL ONCE
Refills: 0 | Status: DISCONTINUED | OUTPATIENT
Start: 2023-05-23 | End: 2023-05-24

## 2023-05-23 RX ORDER — ACETAMINOPHEN 500 MG
1000 TABLET ORAL ONCE
Refills: 0 | Status: COMPLETED | OUTPATIENT
Start: 2023-05-23 | End: 2023-05-23

## 2023-05-23 RX ADMIN — Medication 400 MILLIGRAM(S): at 09:29

## 2023-05-23 RX ADMIN — Medication 12 MILLIGRAM(S): at 22:44

## 2023-05-23 NOTE — OB PROVIDER LABOR PROGRESS NOTE - ASSESSMENT
- reg diet  - nst bid  - sliv  - pnvs  - hsq/scds    [facility]
-Rodriguez cerclage removed w/o issue intact  -cervix reexamined and found to be closed  -plan to repeat VE in am to assess for any change  -BMZ for fetal lung maturity    Pt seen w/ Dr. Urvashi William PGY4

## 2023-05-23 NOTE — OB PROVIDER LABOR PROGRESS NOTE - NS_SUBJECTIVE/OBJECTIVE_OBGYN_ALL_OB_FT
Patient seen and examined.  States ctxs are less than upon admission  Denies vaginal bleeding or LOF    CAT1  ctx q 6 mins    cx: ftp/50/-3    34.5 weeks in  contractions  -advance to clear liquids
pt seen for cerclage removal
Patient seen and examined at bedside.

## 2023-05-24 ENCOUNTER — TRANSCRIPTION ENCOUNTER (OUTPATIENT)
Age: 28
End: 2023-05-24

## 2023-05-24 VITALS
DIASTOLIC BLOOD PRESSURE: 59 MMHG | TEMPERATURE: 98 F | RESPIRATION RATE: 16 BRPM | OXYGEN SATURATION: 97 % | HEART RATE: 86 BPM | SYSTOLIC BLOOD PRESSURE: 109 MMHG

## 2023-05-24 NOTE — DISCHARGE NOTE ANTEPARTUM - PATIENT PORTAL LINK FT
You can access the FollowMyHealth Patient Portal offered by [facility] by registering at the following website: [email or web address]. By joining [employer]’s FollowMyHealth portal, you will also be able to view your health information using other applications (apps) compatible with our system.

## 2023-05-24 NOTE — PROGRESS NOTE ADULT - ATTENDING COMMENTS
Patient seen at bedside   resting comfortably   no contractions   +fm , no VB   stable for DC home today   f/u in office later this week

## 2023-05-24 NOTE — PROGRESS NOTE ADULT - SUBJECTIVE AND OBJECTIVE BOX
R3 OB Antepartum Progress Note    Patient seen and examined at bedside, no acute overnight events. No acute complaints. Patient denies contractions, vaginal bleeding, leakage of fluid. Reports good fetal movement. Patient is ambulating and tolerating regular diet. Denies CP, SOB, N/V, fevers, chills, or any other concerns.    Vital Signs Last 24 Hours  T(C): 36.8 (05-24-23 @ 05:58), Max: 37 (05-24-23 @ 01:30)  HR: 79 (05-24-23 @ 05:58) (70 - 97)  BP: 104/58 (05-24-23 @ 05:58) (103/58 - 115/71)  RR: 18 (05-24-23 @ 05:58) (16 - 18)  SpO2: 97% (05-24-23 @ 05:58) (97% - 100%)    I&O's Summary  22 May 2023 07:01  -  23 May 2023 07:00  --------------------------------------------------------  IN: 875 mL / OUT: 0 mL / NET: 875 mL      Physical Exam:  General: NAD  CV: RR  Lungs: breathing comfortably on RA  Abdomen: soft, gravid, non-distended, non-tender  Ext: no pain or swelling    Labs:             10.8<L>  11.07<H> )-----------( 253      ( 05-22 @ 22:20 )             34.9       MEDICATIONS  (STANDING):  ondansetron Injectable 4 milliGRAM(s) IV Push once
R3 Antepartum Progress Note    Patient seen and examined at bedside, no acute overnight events. No acute complaints. Patient endorses good fetal movement, denies any loss of fluids. Reports contractions have now spaced out. Patient remains NPO. Denies CP, SOB, N/V, fevers, chills, or any other concerns.    Vital Signs Last 24 Hours  T(C): 36.9 (05-22-23 @ 23:35), Max: 36.9 (05-22-23 @ 23:35)  HR: 74 (05-23-23 @ 02:24) (73 - 102)  BP: 108/66 (05-23-23 @ 02:24) (103/73 - 114/76)  RR: 14 (05-22-23 @ 23:35) (14 - 14)  SpO2: 99% (05-22-23 @ 17:59) (99% - 99%)    I&O's Summary    22 May 2023 07:01  -  23 May 2023 04:26  --------------------------------------------------------  IN: 500 mL / OUT: 0 mL / NET: 500 mL        Physical Exam:  General: NAD  CV: RR  Lungs: breathing comfortably on RA  Abdomen: soft, gravid, non-tender  Ext: no pain or swelling    Labs:             10.8<L>  11.07<H> )-----------( 253      ( 05-22 @ 22:20 )             34.9     FHR: baseline 120, moderate variability, + accels, no decels   [place]: irregular ctx, uterine irritability      MEDICATIONS  (STANDING):  lactated ringers. 1000 milliLiter(s) (125 mL/Hr) IV Continuous <Continuous>  morphine  - Injectable 4 milliGRAM(s) IV Push once  morphine  - Injectable 4 milliGRAM(s) SubCutaneous once    MEDICATIONS  (PRN):

## 2023-05-24 NOTE — DISCHARGE NOTE ANTEPARTUM - NS MD DC FALL RISK RISK
For information on Fall & Injury Prevention, visit: [email or web address] OR  [email or web address] OR  https://www.cdc.gov/steadi/patient.html

## 2023-05-24 NOTE — DISCHARGE NOTE ANTEPARTUM - CARE PLAN
1 Principal Discharge DX:	 contractions  Assessment and plan of treatment:	- return to hospital for decreased fetal movement, no fetal movement, cramping, painful contractions, loss of fluid, vaginal bleeding  - please follow up with OBGYN by _______   Principal Discharge DX:	 contractions  Assessment and plan of treatment:	- return to hospital for decreased fetal movement, no fetal movement, cramping, painful contractions, loss of fluid, vaginal bleeding  - please follow up with OBGYN by Friday, 2023

## 2023-05-24 NOTE — DISCHARGE NOTE ANTEPARTUM - PLAN OF CARE
- return to hospital for decreased fetal movement, no fetal movement, cramping, painful contractions, loss of fluid, vaginal bleeding  - please follow up with OBGYN by _______ - return to hospital for decreased fetal movement, no fetal movement, cramping, painful contractions, loss of fluid, vaginal bleeding  - please follow up with OBGYN by Friday, 5/26/2023

## 2023-05-24 NOTE — PROGRESS NOTE ADULT - ASSESSMENT
28 y.o.  @34w5d presenting with painful contractions in the setting of having Rodriguez cerclage. Patient now s/p cerclage removal, feeling more comfortable and contractions spaced out.     # ctx/rule out PTL  - BMZ for FLM (-)  - Initial prolonged monitoring, may switch to NST BID after  - SVE 0/0/-3 s/p cerclage removal    #Fetal wellbeing  - US(): vtx, fundal, JANAY 14.75, EFW 1779 (92%)  - BMZ/Monitoring as above  - GBS(): neg    #Maternal wellbeing  - NPO   - Patient desires to TOLAC  - SCDs for DVT ppx   - Morphine prn    Ruslan Guthrie PGY-3
28 y.o.  @34w6d presenting with painful contractions in the setting of having Rodriguez cerclage. Patient now s/p cerclage removal, feeling more comfortable and contractions resolved. Patient currently in stable condition, maternal + fetal status reassuring.     #Rule out PTL  - SVE 0.5/0/-3 s/p cerclage removal, unchanged after repeat exam  - s/p Cerclage  - Contractions resolved  - Con't to monitor for signs/symptoms of chorioamnionitis or  labor     #Fetal wellbeing  - NST bid  - US(): vtx, fundal, JANAY 14.75, EFW 1779 (92%)  - s/p BMZ for FLM (-)  - GBS(): neg  - NICU consult prn    #Maternal wellbeing  - Reg diet  - Patient desires TOLAC  - HSQ/SCDs for DVT ppx   - Morphine prn    [place] PGY3

## 2023-05-24 NOTE — DISCHARGE NOTE ANTEPARTUM - HOSPITAL COURSE
Patient is a 29 y/o EGA 34 5/7 presented to triage initially for pelvic pressure and intermittent cramping, tightening. Patient history is complicated by short cervix s/p cerclage, and prior c/s for breech. Patient now s/p cerclage removal. Patient transferred to antepartum for further monitoring and evaluation. Sterile vaginal exam after cerclage removal was 0/0/-3. Patient most recent exam, 5/23/2023 was .5/50/-3. Patient found to be more comfortable and contractions have spaced out.  Patient desires for TOLAC, repeat c/s scheduled at this time for 6/29/2023.      Hospital management of patient:  Patient s/p betamethasone for fetal lung maturity ( 5/22-5/23)  ATU ultrasound 5/23/2023: cephalic presentation, posterior placenta, no previa, JANAY 20.68, 8/8 BPP, EFW 2680  GBS status: negative (5/19) Patient is a 27 y/o EGA 34 5/7 presented to triage initially for pelvic pressure and intermittent cramping, tightening. Patient history is complicated by short cervix s/p cerclage, and prior c/s for breech. Patient now s/p cerclage removal. Patient transferred to antepartum for further monitoring and evaluation. Sterile vaginal exam after cerclage removal was 0/0/-3. Patient most recent exam, 5/23/2023 was .5/50/-3. Patient found to be more comfortable and contractions have spaced out. Patient desires for TOLAC, repeat c/s scheduled at this time for 6/29/2023.  Patient cleared for discharge to home by , day of discharge bedside evaluation completed. Patient to follow up on Friday, 5/26/2023.    Hospital management of patient:  Patient s/p betamethasone for fetal lung maturity ( 5/22-5/23)  ATU ultrasound 5/23/2023: cephalic presentation, posterior placenta, no previa, JANAY 20.68, 8/8 BPP, EFW 2680  GBS status: negative (5/19)

## 2023-05-24 NOTE — DISCHARGE NOTE ANTEPARTUM - CARE PROVIDER_API CALL
April Menendez)  Obstetrics and Gynecology  [address] [place] [address]  Phone: [phone]  Fax: [phone]  Follow Up Time:

## 2023-05-24 NOTE — DISCHARGE NOTE ANTEPARTUM - MEDICATION SUMMARY - MEDICATIONS TO TAKE
I will START or STAY ON the medications listed below when I get home from the hospital:    PNV Prenatal oral tablet  -- 1 tab(s) by mouth once a day  -- Indication: For maternal well being    Vitamin D3  -- 1 cap(s) by mouth once a week  -- Indication: For  maternal well being

## 2023-05-26 ENCOUNTER — OUTPATIENT (OUTPATIENT)
Dept: INPATIENT UNIT | Facility: HOSPITAL | Age: 28
LOS: 1 days | Discharge: HOME | End: 2023-05-26
Payer: COMMERCIAL

## 2023-05-26 VITALS — DIASTOLIC BLOOD PRESSURE: 83 MMHG | HEART RATE: 69 BPM | SYSTOLIC BLOOD PRESSURE: 135 MMHG

## 2023-05-26 VITALS — TEMPERATURE: 98 F

## 2023-05-26 DIAGNOSIS — Z98.891 HISTORY OF UTERINE SCAR FROM PREVIOUS SURGERY: Chronic | ICD-10-CM

## 2023-05-26 DIAGNOSIS — O26.899 OTHER SPECIFIED PREGNANCY RELATED CONDITIONS, UNSPECIFIED TRIMESTER: ICD-10-CM

## 2023-05-26 DIAGNOSIS — Z90.49 ACQUIRED ABSENCE OF OTHER SPECIFIED PARTS OF DIGESTIVE TRACT: Chronic | ICD-10-CM

## 2023-05-26 DIAGNOSIS — Z98.890 OTHER SPECIFIED POSTPROCEDURAL STATES: Chronic | ICD-10-CM

## 2023-05-26 LAB
APPEARANCE UR: CLEAR — SIGNIFICANT CHANGE UP
BILIRUB UR-MCNC: NEGATIVE — SIGNIFICANT CHANGE UP
COLOR SPEC: YELLOW — SIGNIFICANT CHANGE UP
COVID-19 SPIKE DOMAIN AB INTERP: POSITIVE
COVID-19 SPIKE DOMAIN ANTIBODY RESULT: >250 U/ML — HIGH
DIFF PNL FLD: NEGATIVE — SIGNIFICANT CHANGE UP
GLUCOSE UR QL: NEGATIVE — SIGNIFICANT CHANGE UP
KETONES UR-MCNC: NEGATIVE — SIGNIFICANT CHANGE UP
LEUKOCYTE ESTERASE UR-ACNC: NEGATIVE — SIGNIFICANT CHANGE UP
NITRITE UR-MCNC: NEGATIVE — SIGNIFICANT CHANGE UP
PH UR: 7 — SIGNIFICANT CHANGE UP (ref 5–8)
PROT UR-MCNC: ABNORMAL
SARS-COV-2 IGG+IGM SERPL QL IA: >250 U/ML — HIGH
SARS-COV-2 IGG+IGM SERPL QL IA: POSITIVE
SP GR SPEC: 1.02 — SIGNIFICANT CHANGE UP (ref 1.01–1.05)
UROBILINOGEN FLD QL: SIGNIFICANT CHANGE UP

## 2023-05-26 PROCEDURE — 99221 1ST HOSP IP/OBS SF/LOW 40: CPT | Mod: 25

## 2023-05-26 PROCEDURE — 59025 FETAL NON-STRESS TEST: CPT | Mod: 26

## 2023-05-26 NOTE — OB PROVIDER TRIAGE NOTE - NSOBPROVIDERNOTE_OBGYN_ALL_OB_FT
Fetal monitoring  UA sent    28y  at 35w1d, no evidence of  labor at this time  As per Dr Thomas, no cervical change. Pt same exam as when she checked her cervix last couple of days ago.    D/w Dr Thomas  Discharge home with instructions  Written and verbal instructions for  labor given  Pt is instructed to to follow up with her OB appt later today as scheduled  Pt to monitor fetal kick counts  Pt instructed to increase PO hydration

## 2023-05-26 NOTE — OB PROVIDER TRIAGE NOTE - NSFIRSTDATEVISIT_OBGYN_ALL_OB
[facility] EMERGENCY DEPARTMENT    Thank you for your patience today.      You have been seen and evaluated for chest discomfort    We discussed the results of your workup. Your labs, EKG, and chest xray did not show severe findings  Please read the instructions provided  If given prescriptions, take as instructed    Remember, your care process does not end after your visit today. Please follow-up with your doctor within 2-4 days for a follow-up check to ensure you are  improving, to see if you need any further evaluation/testing, or to evaluate for any alternate diagnoses.     Please return to the emergency department if you develop severe and persistent chest pain, difficulty breathing, back pain, abdominal pain, lightheadedness or dizziness, passing out, new swelling, fevers, coughing up blood, feeling ill, not acting normally, worsening symptoms, or for any other concerns as these can be signs of a medical emergency.     We hope you feel better.     
Unknown

## 2023-05-26 NOTE — OB RN TRIAGE NOTE - NSICDXNOFAMILYHX_GEN_ALL_CORE
[place] Counseling:  I discussed with the patient the risks of imiquimod including but not limited to erythema, scaling, itching, weeping, crusting, and pain.  Patient understands that the inflammatory response to imiquimod is variable from person to person and was educated regarded proper titration schedule.  If flu-like symptoms develop, patient knows to discontinue the medication and contact us. <-- Click to add NO pertinent Family History

## 2023-05-26 NOTE — OB PROVIDER TRIAGE NOTE - NSHPPHYSICALEXAM_GEN_ALL_CORE
T(C): 36.6 (05-26-23 @ 02:45), Max: 36.6 (05-26-23 @ 02:31)  HR: 71 (05-26-23 @ 02:45) (71 - 71)  BP: 118/70 (05-26-23 @ 02:45) (118/70 - 118/70)  RR: 16 (05-26-23 @ 02:45) (16 - 16)    Heart: RRR  Lungs: CTA  Abdomen: Gravid, soft, NT    NST: Reactive with moderate variability, Category 1 tracing  [place]: Regular contractions  VE: 1/40/-3, intact

## 2023-05-26 NOTE — OB RN TRIAGE NOTE - NS_OBGYNHISTORY_OBGYN_ALL_OB_FT
C/s 2019 @32wks for PTL/incompetent cervix, Female 4lbs 3oz  cerclage placed @ 16wks with this pregnancy, removed Mon 5/22

## 2023-05-26 NOTE — OB PROVIDER TRIAGE NOTE - HISTORY OF PRESENT ILLNESS
28y  at 35w1d presents to triage c/o irregular contractions during the night.  Reports +FM, no vaginal bleeding, no ROM or LOF. Denies any urinary symptoms.  Prenatal care: [facility]  Patient is s/p Rodriguez cerclage removal on 23 and s/p betamethasone.  h/o prior c/s for  labor and breech presentation at 32wks, desires TOLAC    GYN: Denies any h/o STDs, fibroids, ovarian Cyst, or abnormal pap smear  OBH: 32 wks C/s 4#3 Breech presentation  PAST MEDICAL   PAST SURGICAL HISTORY:  -History of appendectomy  -2019 C/section  -H/O umbilical hernia repair

## 2023-05-26 NOTE — OB PROVIDER TRIAGE NOTE - NSHPLABSRESULTS_GEN_ALL_CORE
LABS  Urinalysis Basic - ( 26 May 2023 02:54 )    Color: Yellow / Appearance: Clear / S.024 / pH: x  Gluc: x / Ketone: Negative  / Bili: Negative / Urobili: <2 mg/dL   Blood: x / Protein: Trace / Nitrite: Negative   Leuk Esterase: Negative / RBC: x / WBC x   Sq Epi: x / Non Sq Epi: x / Bacteria: x

## 2023-05-26 NOTE — OB PROVIDER TRIAGE NOTE - PLAN OF CARE
Discharge home with instructions  Written and verbal instructions for  labor given  Pt is instructed to to follow up with her OB appt later today as scheduled  Pt to monitor fetal kick counts  Pt instructed to increase PO hydration

## 2023-05-29 DIAGNOSIS — O47.03 FALSE LABOR BEFORE 37 COMPLETED WEEKS OF GESTATION, THIRD TRIMESTER: ICD-10-CM

## 2023-05-29 DIAGNOSIS — Z3A.35 35 WEEKS GESTATION OF PREGNANCY: ICD-10-CM

## 2023-05-29 DIAGNOSIS — O34.219 MATERNAL CARE FOR UNSPECIFIED TYPE SCAR FROM PREVIOUS CESAREAN DELIVERY: ICD-10-CM

## 2023-05-30 ENCOUNTER — APPOINTMENT (OUTPATIENT)
Dept: ANTEPARTUM | Facility: CLINIC | Age: 28
End: 2023-05-30

## 2023-06-08 NOTE — BRIEF OPERATIVE NOTE - URINE OUTPUT
[Normal Development] : Normal Development [None] : none [Goes to the bathroom and has] : goes to bathroom and has bowel movement by self [Dresses and undresses without] : dresses and undresses without much help [Plays make-believe] : plays make-believe [Uses 4-word sentences] : uses 4-word sentences 5 [Uses words that are 100%] : uses words that are 100% intelligible to strangers [Tells a story from a book] : tells a story from a book [Climbs stairs, alternating feet] : climbs stairs, alternating feet without support [Draws a person with head and] : draws a person with head and 3 body part [Grasps a pencil with thumb and] : grasps a pencil with thumb and fingers instead of fist

## 2023-06-10 ENCOUNTER — INPATIENT (INPATIENT)
Facility: HOSPITAL | Age: 28
LOS: 0 days | Discharge: HOME | End: 2023-06-10
Attending: STUDENT IN AN ORGANIZED HEALTH CARE EDUCATION/TRAINING PROGRAM | Admitting: STUDENT IN AN ORGANIZED HEALTH CARE EDUCATION/TRAINING PROGRAM

## 2023-06-10 ENCOUNTER — TRANSCRIPTION ENCOUNTER (OUTPATIENT)
Age: 28
End: 2023-06-10

## 2023-06-10 VITALS — SYSTOLIC BLOOD PRESSURE: 125 MMHG | DIASTOLIC BLOOD PRESSURE: 75 MMHG | HEART RATE: 70 BPM

## 2023-06-10 VITALS
SYSTOLIC BLOOD PRESSURE: 105 MMHG | TEMPERATURE: 98 F | DIASTOLIC BLOOD PRESSURE: 64 MMHG | HEART RATE: 81 BPM | RESPIRATION RATE: 16 BRPM

## 2023-06-10 DIAGNOSIS — O26.899 OTHER SPECIFIED PREGNANCY RELATED CONDITIONS, UNSPECIFIED TRIMESTER: ICD-10-CM

## 2023-06-10 DIAGNOSIS — Z98.891 HISTORY OF UTERINE SCAR FROM PREVIOUS SURGERY: Chronic | ICD-10-CM

## 2023-06-10 DIAGNOSIS — Z98.890 OTHER SPECIFIED POSTPROCEDURAL STATES: Chronic | ICD-10-CM

## 2023-06-10 DIAGNOSIS — Z90.49 ACQUIRED ABSENCE OF OTHER SPECIFIED PARTS OF DIGESTIVE TRACT: Chronic | ICD-10-CM

## 2023-06-10 LAB
BASOPHILS # BLD AUTO: 0.02 K/UL — SIGNIFICANT CHANGE UP (ref 0–0.2)
BASOPHILS NFR BLD AUTO: 0.2 % — SIGNIFICANT CHANGE UP (ref 0–2)
BLD GP AB SCN SERPL QL: NEGATIVE — SIGNIFICANT CHANGE UP
COVID-19 SPIKE DOMAIN AB INTERP: POSITIVE
COVID-19 SPIKE DOMAIN ANTIBODY RESULT: >250 U/ML — HIGH
EOSINOPHIL # BLD AUTO: 0.05 K/UL — SIGNIFICANT CHANGE UP (ref 0–0.5)
EOSINOPHIL NFR BLD AUTO: 0.4 % — SIGNIFICANT CHANGE UP (ref 0–6)
HCT VFR BLD CALC: 35.2 % — SIGNIFICANT CHANGE UP (ref 34.5–45)
HGB BLD-MCNC: 10.7 G/DL — LOW (ref 11.5–15.5)
IANC: 8.49 K/UL — HIGH (ref 1.8–7.4)
IMM GRANULOCYTES NFR BLD AUTO: 0.3 % — SIGNIFICANT CHANGE UP (ref 0–0.9)
LYMPHOCYTES # BLD AUTO: 2.71 K/UL — SIGNIFICANT CHANGE UP (ref 1–3.3)
LYMPHOCYTES # BLD AUTO: 21.8 % — SIGNIFICANT CHANGE UP (ref 13–44)
MCHC RBC-ENTMCNC: 22.5 PG — LOW (ref 27–34)
MCHC RBC-ENTMCNC: 30.4 GM/DL — LOW (ref 32–36)
MCV RBC AUTO: 74.1 FL — LOW (ref 80–100)
MONOCYTES # BLD AUTO: 1.13 K/UL — HIGH (ref 0–0.9)
MONOCYTES NFR BLD AUTO: 9.1 % — SIGNIFICANT CHANGE UP (ref 2–14)
NEUTROPHILS # BLD AUTO: 8.49 K/UL — HIGH (ref 1.8–7.4)
NEUTROPHILS NFR BLD AUTO: 68.2 % — SIGNIFICANT CHANGE UP (ref 43–77)
NRBC # BLD: 0 /100 WBCS — SIGNIFICANT CHANGE UP (ref 0–0)
NRBC # FLD: 0 K/UL — SIGNIFICANT CHANGE UP (ref 0–0)
PLATELET # BLD AUTO: 231 K/UL — SIGNIFICANT CHANGE UP (ref 150–400)
RBC # BLD: 4.75 M/UL — SIGNIFICANT CHANGE UP (ref 3.8–5.2)
RBC # FLD: 16 % — HIGH (ref 10.3–14.5)
RH IG SCN BLD-IMP: POSITIVE — SIGNIFICANT CHANGE UP
SARS-COV-2 IGG+IGM SERPL QL IA: >250 U/ML — HIGH
SARS-COV-2 IGG+IGM SERPL QL IA: POSITIVE
T PALLIDUM AB TITR SER: NEGATIVE — SIGNIFICANT CHANGE UP
WBC # BLD: 12.44 K/UL — HIGH (ref 3.8–10.5)
WBC # FLD AUTO: 12.44 K/UL — HIGH (ref 3.8–10.5)

## 2023-06-10 RX ORDER — OXYTOCIN 10 UNIT/ML
333.33 VIAL (ML) INJECTION
Qty: 20 | Refills: 0 | Status: DISCONTINUED | OUTPATIENT
Start: 2023-06-10 | End: 2023-06-10

## 2023-06-10 RX ORDER — SODIUM CHLORIDE 9 MG/ML
1000 INJECTION, SOLUTION INTRAVENOUS
Refills: 0 | Status: DISCONTINUED | OUTPATIENT
Start: 2023-06-10 | End: 2023-06-10

## 2023-06-10 RX ORDER — ONDANSETRON 8 MG/1
4 TABLET, FILM COATED ORAL ONCE
Refills: 0 | Status: COMPLETED | OUTPATIENT
Start: 2023-06-10 | End: 2023-06-10

## 2023-06-10 RX ORDER — ONDANSETRON 8 MG/1
1 TABLET, FILM COATED ORAL
Refills: 0 | DISCHARGE

## 2023-06-10 RX ORDER — CHLORHEXIDINE GLUCONATE 213 G/1000ML
1 SOLUTION TOPICAL DAILY
Refills: 0 | Status: DISCONTINUED | OUTPATIENT
Start: 2023-06-10 | End: 2023-06-10

## 2023-06-10 RX ADMIN — ONDANSETRON 4 MILLIGRAM(S): 8 TABLET, FILM COATED ORAL at 10:34

## 2023-06-10 NOTE — CHART NOTE - NSCHARTNOTEFT_GEN_A_CORE
Patient seen at bedside   Discussed with patient unchanged VE   Tracing category 1   Epidural to be removed   Patient to be discharged home   Has f/u in the office on 6/12   Labor precautions given

## 2023-06-10 NOTE — OB RN PATIENT PROFILE - NS_PLACENTAPOSESS_OBGYN_ALL_OB
Pt asleep with lights and tv off  No signs of distress at this time  Will continue with pt observation   Pt 1:1 is present     Val Turk  01/23/22 6399 No

## 2023-06-10 NOTE — OB PROVIDER TRIAGE NOTE - NSHPPHYSICALEXAM_GEN_ALL_CORE
ICU Vital Signs Last 24 Hrs  T(C): 36.5 (10 Carlos 2023 03:15), Max: 36.5 (10 Carlos 2023 03:09)  T(F): 97.7 (10 Carlos 2023 03:15), Max: 97.7 (10 Carlos 2023 03:09)  HR: 81 (10 Carlos 2023 03:15) (81 - 81)  BP: 105/64 (10 Carlos 2023 03:15) (105/64 - 105/64)  BP(mean): --  ABP: --  ABP(mean): --  RR: 16 (10 Carlos 2023 03:15) (16 - 16)  SpO2: --    Abdomen soft nontender  SVE: 2.5/60/-3

## 2023-06-10 NOTE — DISCHARGE NOTE ANTEPARTUM - CARE PROVIDER_API CALL
April Menendez  Obstetrics and Gynecology  [address] [place] [id number]  Phone: [phone]  Fax: [phone]  Established Patient  Follow Up Time: 1-3 days

## 2023-06-10 NOTE — DISCHARGE NOTE ANTEPARTUM - PATIENT PORTAL LINK FT
You can access the FollowMyHealth Patient Portal offered by [facility] by registering at the following website: [email or web address]. By joining [employer]’s FollowMyHealth portal, you will also be able to view your health information using other applications (apps) compatible with our system.

## 2023-06-10 NOTE — OB PROVIDER TRIAGE NOTE - HISTORY OF PRESENT ILLNESS
Pt. is a 27y/o  EGA 37.2wks reports of painful contractions since 1930 every 5-6mins pain 6/10. Pt. denies leakage of fluid and vaginal bleeding. Pt. reports good fetal movement. Pt. desires to TOLAC. Pt. is scheduled for repeat  23    AP: Cervical insufficiency Rodriguez Cerclage placed 23 removed 23

## 2023-06-10 NOTE — DISCHARGE NOTE ANTEPARTUM - HOSPITAL COURSE
Patient admitted in suspected  labor. After greater than 12 hours unchanged vaginal exam. Tracing category 1. Patient discharged home with followup in the office on Monday

## 2023-06-10 NOTE — OB RN PATIENT PROFILE - FUNCTIONAL SCREEN CURRENT LEVEL: COMMUNICATION, MLM
Protonix called in for reflux not being resolved with Zantac. Use daily as needed. Gail Arango
0 = understands/communicates without difficulty

## 2023-06-10 NOTE — OB PROVIDER H&P - HISTORY OF PRESENT ILLNESS
Pt. is a 29y/o  EGA 37.2wks reports of painful contractions since 1930 every 5-6mins pain 6/10. Pt. denies leakage of fluid and vaginal bleeding. Pt. reports good fetal movement. Pt. desires to TOLAC. Pt. is scheduled for repeat  23    AP: Cervical insufficiency Rodriguez Cerclage placed 23 removed 23

## 2023-06-10 NOTE — DISCHARGE NOTE ANTEPARTUM - NS MD DC FALL RISK RISK
For information on Fall & Injury Prevention, visit: [email or web address] OR  [email or web address] OR  https://www.cdc.gov/steadi/patient.html

## 2023-06-10 NOTE — OB PROVIDER H&P - NSHPPHYSICALEXAM_GEN_ALL_CORE
ICU Vital Signs Last 24 Hrs  T(C): 36.5 (10 Carlos 2023 03:15), Max: 36.5 (10 Carlos 2023 03:09)  T(F): 97.7 (10 Carlos 2023 03:15), Max: 97.7 (10 [place] 2023 03:09)  HR: 81 (10 Carlos 2023 03:15) (81 - 81)  BP: 105/64 (10 Carlos 2023 03:15) (105/64 - 105/64)  BP(mean): --  ABP: --  ABP(mean): --  RR: 16 (10 Carlos 2023 03:15) (16 - 16)  SpO2: --    Abdomen soft nontender  SVE: 2.5/60/-3  TAS: Cephalic presentation   GBS: Neg (5/16/23)  EFW: 3200gms by leopolds   FHR: 135bpm, moderate variability, accels, no decels   Jc every 2-4mins

## 2023-06-10 NOTE — CHART NOTE - NSCHARTNOTEFT_GEN_A_CORE
Patient seen at bedside   Tracing category 1   [place]: irregular   VE: unchanged 2.5/60/-3   Discussed with patient reexamining at 7pm to see if any cervical dilation occurs   Reassess as needed

## 2023-06-10 NOTE — DISCHARGE NOTE ANTEPARTUM - MEDICATION SUMMARY - MEDICATIONS TO TAKE
I will START or STAY ON the medications listed below when I get home from the hospital:    PNV Prenatal oral tablet  -- 1 tab(s) by mouth once a day  -- Indication: For Other pregnancy-related conditions, antepartum    Vitamin D3  -- 1 cap(s) by mouth once a week  -- Indication: For Other pregnancy-related conditions, antepartum

## 2023-06-10 NOTE — OB PROVIDER H&P - ASSESSMENT
Evidence of early labor, discussed findings with Dr. Mitchell  -Admit to L&D  -Routine labs ordered   -2units PRBCs ordered   -For epidural  -Expectant management

## 2023-06-15 ENCOUNTER — ASOB RESULT (OUTPATIENT)
Age: 28
End: 2023-06-15

## 2023-06-15 ENCOUNTER — APPOINTMENT (OUTPATIENT)
Dept: ANTEPARTUM | Facility: CLINIC | Age: 28
End: 2023-06-15
Payer: COMMERCIAL

## 2023-06-15 ENCOUNTER — OUTPATIENT (OUTPATIENT)
Dept: OUTPATIENT SERVICES | Facility: HOSPITAL | Age: 28
LOS: 1 days | Discharge: HOME | End: 2023-06-15
Payer: COMMERCIAL

## 2023-06-15 ENCOUNTER — OUTPATIENT (OUTPATIENT)
Dept: OUTPATIENT SERVICES | Facility: HOSPITAL | Age: 28
LOS: 1 days | End: 2023-06-15

## 2023-06-15 VITALS
SYSTOLIC BLOOD PRESSURE: 123 MMHG | HEART RATE: 91 BPM | DIASTOLIC BLOOD PRESSURE: 72 MMHG | TEMPERATURE: 99 F | RESPIRATION RATE: 16 BRPM

## 2023-06-15 VITALS
HEART RATE: 75 BPM | DIASTOLIC BLOOD PRESSURE: 80 MMHG | OXYGEN SATURATION: 98 % | SYSTOLIC BLOOD PRESSURE: 124 MMHG | WEIGHT: 214.07 LBS | RESPIRATION RATE: 18 BRPM | HEIGHT: 66 IN | TEMPERATURE: 98 F

## 2023-06-15 VITALS — HEART RATE: 85 BPM | SYSTOLIC BLOOD PRESSURE: 120 MMHG | DIASTOLIC BLOOD PRESSURE: 73 MMHG

## 2023-06-15 DIAGNOSIS — Z90.49 ACQUIRED ABSENCE OF OTHER SPECIFIED PARTS OF DIGESTIVE TRACT: Chronic | ICD-10-CM

## 2023-06-15 DIAGNOSIS — Z98.890 OTHER SPECIFIED POSTPROCEDURAL STATES: Chronic | ICD-10-CM

## 2023-06-15 DIAGNOSIS — O26.899 OTHER SPECIFIED PREGNANCY RELATED CONDITIONS, UNSPECIFIED TRIMESTER: ICD-10-CM

## 2023-06-15 DIAGNOSIS — Z98.891 HISTORY OF UTERINE SCAR FROM PREVIOUS SURGERY: Chronic | ICD-10-CM

## 2023-06-15 LAB
APPEARANCE UR: ABNORMAL
BILIRUB UR-MCNC: NEGATIVE — SIGNIFICANT CHANGE UP
BLD GP AB SCN SERPL QL: NEGATIVE — SIGNIFICANT CHANGE UP
COLOR SPEC: YELLOW — SIGNIFICANT CHANGE UP
DIFF PNL FLD: NEGATIVE — SIGNIFICANT CHANGE UP
GLUCOSE UR QL: NEGATIVE — SIGNIFICANT CHANGE UP
HCT VFR BLD CALC: 32.9 % — LOW (ref 34.5–45)
HGB BLD-MCNC: 11.1 G/DL — LOW (ref 11.5–15.5)
KETONES UR-MCNC: NEGATIVE — SIGNIFICANT CHANGE UP
LEUKOCYTE ESTERASE UR-ACNC: NEGATIVE — SIGNIFICANT CHANGE UP
MCHC RBC-ENTMCNC: 24.9 PG — LOW (ref 27–34)
MCHC RBC-ENTMCNC: 33.7 GM/DL — SIGNIFICANT CHANGE UP (ref 32–36)
MCV RBC AUTO: 73.9 FL — LOW (ref 80–100)
NITRITE UR-MCNC: NEGATIVE — SIGNIFICANT CHANGE UP
NRBC # BLD: 0 /100 WBCS — SIGNIFICANT CHANGE UP (ref 0–0)
NRBC # FLD: 0 K/UL — SIGNIFICANT CHANGE UP (ref 0–0)
PH UR: 7.5 — SIGNIFICANT CHANGE UP (ref 5–8)
PLATELET # BLD AUTO: 237 K/UL — SIGNIFICANT CHANGE UP (ref 150–400)
PROT UR-MCNC: ABNORMAL
RBC # BLD: 4.45 M/UL — SIGNIFICANT CHANGE UP (ref 3.8–5.2)
RBC # FLD: 16.7 % — HIGH (ref 10.3–14.5)
RH IG SCN BLD-IMP: POSITIVE — SIGNIFICANT CHANGE UP
SP GR SPEC: 1.02 — SIGNIFICANT CHANGE UP (ref 1.01–1.05)
UROBILINOGEN FLD QL: SIGNIFICANT CHANGE UP
WBC # BLD: 7.45 K/UL — SIGNIFICANT CHANGE UP (ref 3.8–10.5)
WBC # FLD AUTO: 7.45 K/UL — SIGNIFICANT CHANGE UP (ref 3.8–10.5)

## 2023-06-15 PROCEDURE — 99212 OFFICE O/P EST SF 10 MIN: CPT | Mod: 25

## 2023-06-15 PROCEDURE — 59025 FETAL NON-STRESS TEST: CPT | Mod: 26

## 2023-06-15 PROCEDURE — 76819 FETAL BIOPHYS PROFIL W/O NST: CPT | Mod: 26

## 2023-06-15 PROCEDURE — 76815 OB US LIMITED FETUS(S): CPT | Mod: 26

## 2023-06-15 NOTE — OB PROVIDER TRIAGE NOTE - ADDITIONAL INSTRUCTIONS
no evidence of active labor  maternal/ fetal surveillance reassuring   plan discussed with dr elkins  labor precautions reviewed with patient  continue fetal kick counts, rest and activity as tolerated, increase PO fluid  follow up at scheduled appointment tonight at 6pm with primary OB  pt verbalized understanding of instructions given  discharged home at 1205

## 2023-06-15 NOTE — OB PROVIDER TRIAGE NOTE - NSHPPHYSICALEXAM_GEN_ALL_CORE
Vital Signs Last 24 Hrs  T(C): 37.0 (15 Carlos 2023 11:05), Max: 37 (15 Carlos 2023 11:00)  T(F): 98.6 (15 Carlos 2023 11:05), Max: 98.6 (15 Carlos 2023 11:00)  HR: 85 (15 Carlos 2023 11:52) (75 - 91)  BP: 120/73 (15 Carlos 2023 11:52) (120/73 - 124/80)  BP(mean): --  RR: 16 (15 Carlos 2023 11:00) (16 - 18)  SpO2: 98% (15 Carlos 2023 09:41) (98% - 98%)    A&O x3  CTAB  abdomen: gravid, soft, nontender  SVE 2.5/60/-3  NST: 135 baseline, moderate variability + accels, no decels, no contractions, reactive NST  TAS: vtx, fundal placenta, bpp 8/8, joey 14.32  Images and report saved and uploaded in [facility]

## 2023-06-15 NOTE — OB PROVIDER TRIAGE NOTE - NS_CONTRACTPATTER_OBGYN_ALL_OB
The radial band was applied to the right radial artery. 13 cc's of air were inserted into the closure device. Uterine Irritability

## 2023-06-15 NOTE — OB PROVIDER TRIAGE NOTE - HISTORY OF PRESENT ILLNESS
This is a 28 year old G [id number] at 38.0 weeks gestational age presents from [facility] for labor evaluation. Pt states she was at [facility] for scheduled rpt c/s on 6/22 and reported mild contractions. Pt states pain is 2/10, does not required pain management at this time. Pt desires TOLAC. Reports +GFM, denies LOF, VB.    AP:  - Cervical insufficiency Rodriguez Cerclage placed 1/17/23 removed 5/23/23  - Admitted 5/22-5/24 received steroids

## 2023-06-15 NOTE — OB RN TRIAGE NOTE - CHIEF COMPLAINT QUOTE
contractions contractions desires tolac  admitted may 22 s/p cerclage removal pt received steroids 5/22 5/23  pt admitted 6/10 for labor/epidural

## 2023-06-15 NOTE — OB PST NOTE - NS PRO AD ANY ON CHART
Quality 226: Preventive Care And Screening: Tobacco Use: Screening And Cessation Intervention: Patient screened for tobacco use and is an ex/non-smoker Detail Level: Zone No

## 2023-06-15 NOTE — OB PROVIDER TRIAGE NOTE - NSOBPROVIDERNOTE_OBGYN_ALL_OB_FT
This is a 28 year old  at 38.0 weeks gestational age presents to r/o labor    no evidence of active labor  maternal/ fetal surveillance reassuring   plan discussed with dr elkins  labor precautions reviewed with patient  continue fetal kick counts, rest and activity as tolerated, increase PO fluid  follow up at scheduled appointment tonight at 6pm with primary OB  pt verbalized understanding of instructions given  discharged home at 1205

## 2023-06-15 NOTE — OB PST NOTE - HISTORY OF PRESENT ILLNESS
28 year old pregnant female presents to presurgical testing scheduled for repeat Caesarean section. Patient denies vaginal  bleeding, spotting or leakage of amniotic fluid. Patient started regular contractions 6/11 and went to [facility],and was admitted. Per patient 4cm dilated on 6/12.  Patient reports positive fetal movement.  28 year old pregnant female presents to presurgical testing scheduled for repeat Caesarean section. Patient denies vaginal  bleeding, spotting or leakage of amniotic fluid. Patient started regular contractions 6/11 and went to [facility],and was admitted. Per patients she was 4cm dilated on 6/12.  Patient reports positive fetal movement.

## 2023-06-15 NOTE — OB PST NOTE - PROBLEM SELECTOR PLAN 1
Patient tentatively scheduled for repeat  on 23.  Pre-op instructions provided. Pt given verbal and written instructions with teach back on chlorhexidine shampoo Pt verbalized understanding.  CBC, T&S and Ua were done at [facility].  Patient instructed to follow up with her OB-GYN for further instructions with medications.   Patient having regular contractions, 5 minutes apart. Offered ambulance transport to [facility] ER. Patient declined. Signed AMA. Dr. Moreland informed. AMA form in chart. Patient tentatively scheduled for repeat  on 23.  Pre-op instructions provided. Pt given verbal and written instructions with teach back on chlorhexidine shampoo Pt verbalized understanding.  CBC, T&S and Ua were done at [facility].  Patient instructed to follow up with her OB-GYN for further instructions with medications.   Patient having regular contractions, 5 minutes apart. Offered ambulance transport to [facility] ER. Patient declined. States she will go on her own. Signed AMA. Dr. Moreland informed. AMA form in chart.

## 2023-06-15 NOTE — OB PST NOTE - NSHPREVIEWOFSYSTEMS_GEN_ALL_CORE
General: no fever, chills, sweating, anorexia, or weight loss  Pt denies h/o COVID-19 infection in the last 90 days.     Skin: no rashes, itching, or dryness    Breast: no tenderness, lumps, or nipple discharge    Opthalmologic: no diplopia, photophobia, or blurred vision    ENMT: no hearing difficulty, ear pain, tinnitus, or vertigo. No sinus symptoms, nasal congestion, nasal discharge, or nasal obstruction    Respiratory and Thorax: no wheezing, dyspnea, or cough    Cardiovascular: no chest pain, palpitations, dyspnea on exertion, orthopnea, or peripheral edema    Gastrointestinal: no nausea, vomiting, diarrhea, constipation, change in bowel movements, or abdominal pain    Genitourinary and Pelvis: no hematuria, renal colic, flank pain, dysuria, nocturia. No abnormal vaginal bleeding, vaginal discharge, spotting, pelvic pain, or vaginal leakage    Musculoskeletal: no arthralgia, arthritis, joint swelling, muscle cramping, muscle weakness, neck pain, arm pain, or leg pain    Neurological: no transient paralysis, weakness, paresthesias, or seizures. No syncope, tremors, vertigo, loss of sensation, difficulty walking, loss of consciousness    Psychiatric: no suicidal ideation, depression, anxiety    Hematology: no nose bleeding, easy bruising or bleeding, or skin lumps    Lymphatic: no enlarged or tender lymph nodes, or extremity swelling    Endocrine: no heat or cold intolerance    Immunologic: no recurrent or persistent infections

## 2023-06-15 NOTE — OB PST NOTE - ENERGY EXPENDITURE (METS)
METS 5- Able to climb up 2 flights going up stairs, walk up hill and doing house chores without symptoms

## 2023-06-16 DIAGNOSIS — O34.33 MATERNAL CARE FOR CERVICAL INCOMPETENCE, THIRD TRIMESTER: ICD-10-CM

## 2023-06-16 DIAGNOSIS — O47.1 FALSE LABOR AT OR AFTER 37 COMPLETED WEEKS OF GESTATION: ICD-10-CM

## 2023-06-16 DIAGNOSIS — Z3A.38 38 WEEKS GESTATION OF PREGNANCY: ICD-10-CM

## 2023-06-16 DIAGNOSIS — O34.219 MATERNAL CARE FOR UNSPECIFIED TYPE SCAR FROM PREVIOUS CESAREAN DELIVERY: ICD-10-CM

## 2023-06-19 ENCOUNTER — TRANSCRIPTION ENCOUNTER (OUTPATIENT)
Age: 28
End: 2023-06-19

## 2023-06-20 ENCOUNTER — TRANSCRIPTION ENCOUNTER (OUTPATIENT)
Age: 28
End: 2023-06-20

## 2023-06-20 ENCOUNTER — INPATIENT (INPATIENT)
Facility: HOSPITAL | Age: 28
LOS: 1 days | Discharge: HOME | End: 2023-06-22
Attending: STUDENT IN AN ORGANIZED HEALTH CARE EDUCATION/TRAINING PROGRAM | Admitting: STUDENT IN AN ORGANIZED HEALTH CARE EDUCATION/TRAINING PROGRAM

## 2023-06-20 VITALS
TEMPERATURE: 98 F | SYSTOLIC BLOOD PRESSURE: 125 MMHG | DIASTOLIC BLOOD PRESSURE: 78 MMHG | HEART RATE: 61 BPM | RESPIRATION RATE: 18 BRPM

## 2023-06-20 DIAGNOSIS — Z98.890 OTHER SPECIFIED POSTPROCEDURAL STATES: Chronic | ICD-10-CM

## 2023-06-20 DIAGNOSIS — O26.899 OTHER SPECIFIED PREGNANCY RELATED CONDITIONS, UNSPECIFIED TRIMESTER: ICD-10-CM

## 2023-06-20 DIAGNOSIS — Z90.49 ACQUIRED ABSENCE OF OTHER SPECIFIED PARTS OF DIGESTIVE TRACT: Chronic | ICD-10-CM

## 2023-06-20 DIAGNOSIS — Z98.891 HISTORY OF UTERINE SCAR FROM PREVIOUS SURGERY: Chronic | ICD-10-CM

## 2023-06-20 DIAGNOSIS — O42.90 PREMATURE RUPTURE OF MEMBRANES, UNSPECIFIED AS TO LENGTH OF TIME BETWEEN RUPTURE AND ONSET OF LABOR, UNSPECIFIED WEEKS OF GESTATION: ICD-10-CM

## 2023-06-20 PROBLEM — D50.9 IRON DEFICIENCY ANEMIA, UNSPECIFIED: Chronic | Status: ACTIVE | Noted: 2023-06-15

## 2023-06-20 LAB
BASOPHILS # BLD AUTO: 0.02 K/UL — SIGNIFICANT CHANGE UP (ref 0–0.2)
BASOPHILS NFR BLD AUTO: 0.3 % — SIGNIFICANT CHANGE UP (ref 0–2)
COVID-19 SPIKE DOMAIN AB INTERP: POSITIVE
COVID-19 SPIKE DOMAIN ANTIBODY RESULT: >250 U/ML — HIGH
EOSINOPHIL # BLD AUTO: 0.06 K/UL — SIGNIFICANT CHANGE UP (ref 0–0.5)
EOSINOPHIL NFR BLD AUTO: 0.8 % — SIGNIFICANT CHANGE UP (ref 0–6)
HCT VFR BLD CALC: 33.5 % — LOW (ref 34.5–45)
HGB BLD-MCNC: 10.4 G/DL — LOW (ref 11.5–15.5)
IANC: 4.87 K/UL — SIGNIFICANT CHANGE UP (ref 1.8–7.4)
IMM GRANULOCYTES NFR BLD AUTO: 0.3 % — SIGNIFICANT CHANGE UP (ref 0–0.9)
LYMPHOCYTES # BLD AUTO: 2.21 K/UL — SIGNIFICANT CHANGE UP (ref 1–3.3)
LYMPHOCYTES # BLD AUTO: 27.8 % — SIGNIFICANT CHANGE UP (ref 13–44)
MCHC RBC-ENTMCNC: 22.9 PG — LOW (ref 27–34)
MCHC RBC-ENTMCNC: 31 GM/DL — LOW (ref 32–36)
MCV RBC AUTO: 73.6 FL — LOW (ref 80–100)
MONOCYTES # BLD AUTO: 0.76 K/UL — SIGNIFICANT CHANGE UP (ref 0–0.9)
MONOCYTES NFR BLD AUTO: 9.6 % — SIGNIFICANT CHANGE UP (ref 2–14)
NEUTROPHILS # BLD AUTO: 4.87 K/UL — SIGNIFICANT CHANGE UP (ref 1.8–7.4)
NEUTROPHILS NFR BLD AUTO: 61.2 % — SIGNIFICANT CHANGE UP (ref 43–77)
NRBC # BLD: 0 /100 WBCS — SIGNIFICANT CHANGE UP (ref 0–0)
NRBC # FLD: 0 K/UL — SIGNIFICANT CHANGE UP (ref 0–0)
PLATELET # BLD AUTO: 266 K/UL — SIGNIFICANT CHANGE UP (ref 150–400)
RBC # BLD: 4.55 M/UL — SIGNIFICANT CHANGE UP (ref 3.8–5.2)
RBC # FLD: 17 % — HIGH (ref 10.3–14.5)
SARS-COV-2 IGG+IGM SERPL QL IA: >250 U/ML — HIGH
SARS-COV-2 IGG+IGM SERPL QL IA: POSITIVE
T PALLIDUM AB TITR SER: NEGATIVE — SIGNIFICANT CHANGE UP
WBC # BLD: 7.94 K/UL — SIGNIFICANT CHANGE UP (ref 3.8–10.5)
WBC # FLD AUTO: 7.94 K/UL — SIGNIFICANT CHANGE UP (ref 3.8–10.5)

## 2023-06-20 DEVICE — SURGICEL SNOW 2 X 4": Type: IMPLANTABLE DEVICE | Status: FUNCTIONAL

## 2023-06-20 RX ORDER — DIPHENHYDRAMINE HCL 50 MG
25 CAPSULE ORAL EVERY 6 HOURS
Refills: 0 | Status: DISCONTINUED | OUTPATIENT
Start: 2023-06-20 | End: 2023-06-22

## 2023-06-20 RX ORDER — ACETAMINOPHEN 500 MG
975 TABLET ORAL
Refills: 0 | Status: DISCONTINUED | OUTPATIENT
Start: 2023-06-20 | End: 2023-06-22

## 2023-06-20 RX ORDER — OXYCODONE HYDROCHLORIDE 5 MG/1
5 TABLET ORAL
Refills: 0 | Status: DISCONTINUED | OUTPATIENT
Start: 2023-06-20 | End: 2023-06-21

## 2023-06-20 RX ORDER — SIMETHICONE 80 MG/1
80 TABLET, CHEWABLE ORAL EVERY 4 HOURS
Refills: 0 | Status: DISCONTINUED | OUTPATIENT
Start: 2023-06-20 | End: 2023-06-22

## 2023-06-20 RX ORDER — OXYTOCIN 10 UNIT/ML
333.33 VIAL (ML) INJECTION
Qty: 20 | Refills: 0 | Status: DISCONTINUED | OUTPATIENT
Start: 2023-06-20 | End: 2023-06-21

## 2023-06-20 RX ORDER — TETANUS TOXOID, REDUCED DIPHTHERIA TOXOID AND ACELLULAR PERTUSSIS VACCINE, ADSORBED 5; 2.5; 8; 8; 2.5 [IU]/.5ML; [IU]/.5ML; UG/.5ML; UG/.5ML; UG/.5ML
0.5 SUSPENSION INTRAMUSCULAR ONCE
Refills: 0 | Status: DISCONTINUED | OUTPATIENT
Start: 2023-06-20 | End: 2023-06-22

## 2023-06-20 RX ORDER — OXYTOCIN 10 UNIT/ML
333.33 VIAL (ML) INJECTION
Qty: 20 | Refills: 0 | Status: DISCONTINUED | OUTPATIENT
Start: 2023-06-20 | End: 2023-06-20

## 2023-06-20 RX ORDER — KETOROLAC TROMETHAMINE 30 MG/ML
30 SYRINGE (ML) INJECTION EVERY 6 HOURS
Refills: 0 | Status: DISCONTINUED | OUTPATIENT
Start: 2023-06-20 | End: 2023-06-21

## 2023-06-20 RX ORDER — OXYCODONE HYDROCHLORIDE 5 MG/1
5 TABLET ORAL ONCE
Refills: 0 | Status: DISCONTINUED | OUTPATIENT
Start: 2023-06-20 | End: 2023-06-22

## 2023-06-20 RX ORDER — OXYTOCIN 10 UNIT/ML
VIAL (ML) INJECTION
Qty: 20 | Refills: 0 | Status: DISCONTINUED | OUTPATIENT
Start: 2023-06-20 | End: 2023-06-20

## 2023-06-20 RX ORDER — DEXAMETHASONE 0.5 MG/5ML
4 ELIXIR ORAL EVERY 6 HOURS
Refills: 0 | Status: DISCONTINUED | OUTPATIENT
Start: 2023-06-20 | End: 2023-06-21

## 2023-06-20 RX ORDER — SODIUM CHLORIDE 9 MG/ML
1000 INJECTION, SOLUTION INTRAVENOUS
Refills: 0 | Status: DISCONTINUED | OUTPATIENT
Start: 2023-06-20 | End: 2023-06-20

## 2023-06-20 RX ORDER — ONDANSETRON 8 MG/1
4 TABLET, FILM COATED ORAL EVERY 6 HOURS
Refills: 0 | Status: DISCONTINUED | OUTPATIENT
Start: 2023-06-20 | End: 2023-06-21

## 2023-06-20 RX ORDER — CHOLECALCIFEROL (VITAMIN D3) 125 MCG
1 CAPSULE ORAL
Qty: 0 | Refills: 0 | DISCHARGE

## 2023-06-20 RX ORDER — CHLORHEXIDINE GLUCONATE 213 G/1000ML
1 SOLUTION TOPICAL DAILY
Refills: 0 | Status: DISCONTINUED | OUTPATIENT
Start: 2023-06-20 | End: 2023-06-20

## 2023-06-20 RX ORDER — IBUPROFEN 200 MG
600 TABLET ORAL EVERY 6 HOURS
Refills: 0 | Status: COMPLETED | OUTPATIENT
Start: 2023-06-20 | End: 2024-05-18

## 2023-06-20 RX ORDER — OXYCODONE HYDROCHLORIDE 5 MG/1
10 TABLET ORAL
Refills: 0 | Status: DISCONTINUED | OUTPATIENT
Start: 2023-06-20 | End: 2023-06-21

## 2023-06-20 RX ORDER — SODIUM CHLORIDE 9 MG/ML
1000 INJECTION, SOLUTION INTRAVENOUS
Refills: 0 | Status: DISCONTINUED | OUTPATIENT
Start: 2023-06-20 | End: 2023-06-21

## 2023-06-20 RX ORDER — HEPARIN SODIUM 5000 [USP'U]/ML
5000 INJECTION INTRAVENOUS; SUBCUTANEOUS EVERY 12 HOURS
Refills: 0 | Status: DISCONTINUED | OUTPATIENT
Start: 2023-06-20 | End: 2023-06-22

## 2023-06-20 RX ORDER — SODIUM CHLORIDE 9 MG/ML
1000 INJECTION, SOLUTION INTRAVENOUS ONCE
Refills: 0 | Status: COMPLETED | OUTPATIENT
Start: 2023-06-20 | End: 2023-06-20

## 2023-06-20 RX ORDER — ACETAMINOPHEN 500 MG
1000 TABLET ORAL ONCE
Refills: 0 | Status: COMPLETED | OUTPATIENT
Start: 2023-06-20 | End: 2023-06-20

## 2023-06-20 RX ORDER — MAGNESIUM HYDROXIDE 400 MG/1
30 TABLET, CHEWABLE ORAL
Refills: 0 | Status: DISCONTINUED | OUTPATIENT
Start: 2023-06-20 | End: 2023-06-22

## 2023-06-20 RX ORDER — OXYCODONE HYDROCHLORIDE 5 MG/1
5 TABLET ORAL
Refills: 0 | Status: DISCONTINUED | OUTPATIENT
Start: 2023-06-20 | End: 2023-06-22

## 2023-06-20 RX ORDER — LANOLIN
1 OINTMENT (GRAM) TOPICAL EVERY 6 HOURS
Refills: 0 | Status: DISCONTINUED | OUTPATIENT
Start: 2023-06-20 | End: 2023-06-21

## 2023-06-20 RX ORDER — BUTORPHANOL TARTRATE 2 MG/ML
0.25 INJECTION, SOLUTION INTRAMUSCULAR; INTRAVENOUS EVERY 6 HOURS
Refills: 0 | Status: DISCONTINUED | OUTPATIENT
Start: 2023-06-20 | End: 2023-06-20

## 2023-06-20 RX ORDER — NALOXONE HYDROCHLORIDE 4 MG/.1ML
0.1 SPRAY NASAL
Refills: 0 | Status: DISCONTINUED | OUTPATIENT
Start: 2023-06-20 | End: 2023-06-21

## 2023-06-20 RX ORDER — METOCLOPRAMIDE HCL 10 MG
10 TABLET ORAL ONCE
Refills: 0 | Status: COMPLETED | OUTPATIENT
Start: 2023-06-20 | End: 2023-06-20

## 2023-06-20 RX ADMIN — CHLORHEXIDINE GLUCONATE 1 APPLICATION(S): 213 SOLUTION TOPICAL at 08:00

## 2023-06-20 RX ADMIN — Medication 975 MILLIGRAM(S): at 21:34

## 2023-06-20 RX ADMIN — HEPARIN SODIUM 5000 UNIT(S): 5000 INJECTION INTRAVENOUS; SUBCUTANEOUS at 18:05

## 2023-06-20 RX ADMIN — Medication 1000 MILLIGRAM(S): at 16:55

## 2023-06-20 RX ADMIN — Medication 30 MILLIGRAM(S): at 13:30

## 2023-06-20 RX ADMIN — Medication 4 MILLIGRAM(S): at 16:29

## 2023-06-20 RX ADMIN — SODIUM CHLORIDE 125 MILLILITER(S): 9 INJECTION, SOLUTION INTRAVENOUS at 08:25

## 2023-06-20 RX ADMIN — ONDANSETRON 4 MILLIGRAM(S): 8 TABLET, FILM COATED ORAL at 21:04

## 2023-06-20 RX ADMIN — SODIUM CHLORIDE 1000 MILLILITER(S): 9 INJECTION, SOLUTION INTRAVENOUS at 14:00

## 2023-06-20 RX ADMIN — Medication 975 MILLIGRAM(S): at 22:10

## 2023-06-20 RX ADMIN — Medication 10 MILLIGRAM(S): at 15:35

## 2023-06-20 RX ADMIN — ONDANSETRON 4 MILLIGRAM(S): 8 TABLET, FILM COATED ORAL at 13:50

## 2023-06-20 RX ADMIN — Medication 30 MILLIGRAM(S): at 13:54

## 2023-06-20 RX ADMIN — Medication 400 MILLIGRAM(S): at 16:29

## 2023-06-20 RX ADMIN — SODIUM CHLORIDE 1000 MILLILITER(S): 9 INJECTION, SOLUTION INTRAVENOUS at 16:45

## 2023-06-20 NOTE — OB RN PATIENT PROFILE - FALL HARM RISK - UNIVERSAL INTERVENTIONS
Bed in lowest position, wheels locked, appropriate side rails in place/Call bell, personal items and telephone in reach/Instruct patient to call for assistance before getting out of bed or chair/Non-slip footwear when patient is out of bed/[place] to call system/Physically safe environment - no spills, clutter or unnecessary equipment/Purposeful Proactive Rounding/Room/bathroom lighting operational, light cord in reach

## 2023-06-20 NOTE — OB PROVIDER H&P - ASSESSMENT
28 year old female  @ 38.5GA with SLIUP, prior c/s for breech PTL, incompetant cervix s/p cerclage removal, admitted for SROM  hx: umbilical hernia repair, appendectomy  morphine allergy  PO 9pm 23    nitrazine/ferning pos  /-3  vertex  gbs neg as per pt 23 - (35 days ago)    dx: SROM  d/w Dr. Mckenzie: admit to L/D for TOLAC, expectant management, epidural PRN  ordered/sent admission labs  discussed consents with pt, pt verbalized understanding and signed      resident unavailable

## 2023-06-20 NOTE — OB RN PATIENT PROFILE - TOBACCO USE
"Chief Complaint: Breast Pain    Subjective         History of Present Illness  Lillie Mendosa is a 38 y.o. female presents to [facility] to be seen for right breast pain.  On her recent ultrasound mammogram there was a 5 mm simple cyst at 12:00 in the right breast but a summation nipple this is in the same location as her pain.  This pain is constant and does not resolve with her.  Or at any time during her cycle.  He also had a normal-appearing intramammary node.    SCANNED - IMAGING (02/15/2022)      SCANNED - MAMMO (02/15/2022)    Objective     History reviewed. No pertinent past medical history.    Past Surgical History:   Procedure Laterality Date   •  SECTION           Current Outpatient Medications:   •  omeprazole (priLOSEC) 20 MG capsule, , Disp: , Rfl:   •  ondansetron ODT (ZOFRAN-ODT) 4 MG disintegrating tablet, Place 1 tablet on the tongue Every 8 (Eight) Hours As Needed for Nausea or Vomiting for up to 10 days., Disp: 30 tablet, Rfl: 0  •  sucralfate (CARAFATE) 1 g tablet, Take 1 tablet by mouth 4 (Four) Times a Day for 10 days., Disp: 40 tablet, Rfl: 0    Allergies   Allergen Reactions   • Shellfish-Derived Products Hives and Shortness Of Breath   • Iodinated Diagnostic Agents Hives   • Penicillins Hives        History reviewed. No pertinent family history.    Social History     Socioeconomic History   • Marital status:    Tobacco Use   • Smoking status: Never Smoker   • Smokeless tobacco: Never Used   Vaping Use   • Vaping Use: Never used   Substance and Sexual Activity   • Alcohol use: Not Currently   • Drug use: Not Currently       Vital Signs:   Resp 15   Ht 157.5 cm (62\")   Wt 70.3 kg (155 lb)   BMI 28.35 kg/m²    Review of Systems    Physical Exam  Vitals and nursing note reviewed.   Constitutional:       Appearance: Normal appearance.   HENT:      Head: Normocephalic and atraumatic.   Eyes:      Extraocular Movements: Extraocular movements " intact.      Pupils: Pupils are equal, round, and reactive to light.   Cardiovascular:      Pulses: Normal pulses.   Pulmonary:      Effort: Pulmonary effort is normal. No accessory muscle usage or respiratory distress.   Chest:   Breasts:      Right: Normal. No inverted nipple, nipple discharge, skin change, axillary adenopathy or supraclavicular adenopathy.      Left: Normal. No inverted nipple, nipple discharge, skin change, axillary adenopathy or supraclavicular adenopathy.        Comments: Barely palpable cystic lesion that is not a solid mass located at 12:00 in the right breast  Abdominal:      General: Abdomen is flat.      Palpations: Abdomen is soft.      Tenderness: There is no abdominal tenderness. There is no guarding.   Musculoskeletal:         General: No swelling, tenderness or deformity.      Cervical back: Neck supple.   Lymphadenopathy:      Upper Body:      Right upper body: No supraclavicular or axillary adenopathy.      Left upper body: No supraclavicular or axillary adenopathy.   Skin:     General: Skin is warm and dry.   Neurological:      General: No focal deficit present.      Mental Status: She is alert and oriented to person, place, and time.   Psychiatric:         Mood and Affect: Mood normal.         Thought Content: Thought content normal.          Result Review :               Assessment and Plan    Diagnoses and all orders for this visit:    1. Breast pain (Primary)  -     US Guided Cyst Aspiration Breast Right; Future    2. Cyst of breast, unspecified laterality  -     US Guided Cyst Aspiration Breast Right; Future    Follow-up after cyst aspiration    Follow Up   No follow-ups on file.  Patient was given instructions and counseling regarding her condition or for health maintenance advice. Please see specific information pulled into the AVS if appropriate.         This document has been electronically signed by Trinidad Grubbs MD  March 16, 2022 10:44 EDT        Never smoker

## 2023-06-20 NOTE — DISCHARGE NOTE OB - NS MD DC FALL RISK RISK
For information on Fall & Injury Prevention, visit: [email or web address] OR  [email or web address] OR  https://www.cdc.gov/steadi/patient.html

## 2023-06-20 NOTE — DISCHARGE NOTE OB - CARE PLAN
1 Principal Discharge DX:	Status post repeat low transverse  section  Assessment and plan of treatment:	Term pregnancy in labor for repeat  section  Routine post op care

## 2023-06-20 NOTE — OB RN DELIVERY SUMMARY - NS_LABORANALGESIA_OBGYN_ALL_OB
Splint OK'd per [facility] MICHAEL Mcnamara RN  10/30/20 6817 Analgesia was given

## 2023-06-20 NOTE — OB PROVIDER DELIVERY SUMMARY - NSPROVIDERDELIVERYNOTE_OBGYN_ALL_OB_FT
viable male infant, cephalic presentation, weight 7#3, APGARS 9/9  grossly normal uterus, b/l tubes and ovaries  hysterotomy closed in 2 layers with vicryl suture  surgicel powder placed over hysterotomy  peritoneum closed with vicryl suture  rectus muscle reapproximated with vicryl suture    250/1400/50    Dictation #: viable male infant, cephalic presentation, weight 7#3, APGARS 9/9  grossly normal uterus, b/l tubes and ovaries  hysterotomy closed in 2 layers with vicryl suture  surgicel powder placed over hysterotomy  peritoneum closed with vicryl suture  rectus muscle reapproximated with vicryl suture    250/1400/50    Dictation #: [id number]

## 2023-06-20 NOTE — OB RN TRIAGE NOTE - CHIEF COMPLAINT QUOTE
Contractions and I broke my water at                   ,       Pt. is a previous  C/S and wants to TOLAC . Contractions and I broke my water at 0400, clear.    Pt. is a previous  C/S and wants to TOLAC .

## 2023-06-20 NOTE — OB PROVIDER LABOR PROGRESS NOTE - NS_SUBJECTIVE/OBJECTIVE_OBGYN_ALL_OB_FT
Called into room for patient noting continuous incisional pain even when not lexi   FHTs CAT1 with ctx q 2 mins  CX: 6/90/-1    abd:  pain with palpation over incisional area     Patient was counseled that cannot rule out incisional dehiscence and being remote from delivery would recommend to proceed with repeat cs/  Patient does agree and wishes to proceed with repeat cs   Anesthesia called and OR notified

## 2023-06-20 NOTE — OB RN DELIVERY SUMMARY - NS_SEPSISRSKCALC_OBGYN_ALL_OB_FT
EOS calculated successfully. EOS Risk Factor: 0.5/1000 live births ([facility] national incidence); GA=38w5d; Temp=98.4; ROM=7.083; GBS='Negative'; Antibiotics='No antibiotics or any antibiotics < 2 hrs prior to birth'

## 2023-06-20 NOTE — OB PROVIDER DELIVERY SUMMARY - NSSELHIDDEN_OBGYN_ALL_OB_FT
[NS_DeliveryAttending1_OBGYN_ALL_OB_FT:[id number]],[NS_DeliveryAssist1_OBGYN_ALL_OB_FT:[id number]],[NS_DeliveryRN_OBGYN_ALL_OB_FT:[id number]]

## 2023-06-20 NOTE — PACU DISCHARGE NOTE - COMMENTS
T(C): 36.4 (06-20-23 @ 12:15), Max: 36.9 (06-20-23 @ 06:10)  HR: 73 (06-20-23 @ 14:00) (61 - 87)  BP: 120/75 (06-20-23 @ 14:00) (108/56 - 143/68)  RR: 15 (06-20-23 @ 14:00) (15 - 23)  SpO2: 97% (06-20-23 @ 13:45) (91% - 100%)    Vital signs stable, breathing comfortably on room air. Pain and nausea are controlled. Appropriate strength and sensation in bilateral lower extremities. Signs of neuraxial complications to be aware of were discussed with the patient, to which she expressed understanding. All questions answered. Stable for transfer to the floor.

## 2023-06-20 NOTE — OB PROVIDER H&P - PROBLEM SELECTOR PLAN 1
dx: SROM  d/w Dr. Mckenzie: admit to L/D for TOLAC, expectant management, epidural PRN  ordered/sent admission labs  discussed consents with pt, pt verbalized understanding and signed      resident unavailable

## 2023-06-20 NOTE — OB RN DELIVERY SUMMARY - NS_SKINTOSKINA_OBGYN_ALL_OB
The patient is Stable - Low risk of patient condition declining or worsening    Shift Goals  Clinical Goals: angiogram  Patient Goals: breath better  Family Goals: n/a    Progress made toward(s) clinical / shift goals:      Problem: Psychosocial  Goal: Patient's level of anxiety will decrease  Outcome: Progressing  Goal: Patient's ability to verbalize feelings about condition will improve  Outcome: Progressing  Goal: Patient's ability to re-evaluate and adapt role responsibilities will improve  Outcome: Progressing  Goal: Patient and family will demonstrate ability to cope with life altering diagnosis and/or procedure  Outcome: Progressing  Goal: Spiritual and cultural needs incorporated into hospitalization  Outcome: Progressing     Problem: Communication  Goal: The ability to communicate needs accurately and effectively will improve  Outcome: Progressing     Problem: Discharge Barriers/Planning  Goal: Patient's continuum of care needs are met  Outcome: Progressing     Problem: Hemodynamics  Goal: Patient's hemodynamics, fluid balance and neurologic status will be stable or improve  Outcome: Progressing     Problem: Respiratory  Goal: Patient will achieve/maintain optimum respiratory ventilation and gas exchange  Outcome: Progressing     Problem: Chest Tube Management  Goal: Complications related to chest tube will be avoided or minimized  Outcome: Progressing     Problem: Fluid Volume  Goal: Fluid volume balance will be maintained  Outcome: Progressing     Problem: Mechanical Ventilation  Goal: Safe management of artificial airway and ventilation  Outcome: Progressing  Goal: Successful weaning off mechanical ventilator, spontaneously maintains adequate gas exchange  Outcome: Progressing  Goal: Patient will be able to express needs and understand communication  Outcome: Progressing     Problem: Dysphagia  Goal: Dysphagia will improve  Outcome: Progressing     Problem: Risk for Aspiration  Goal: Patient's risk for  aspiration will be absent or decrease  Outcome: Progressing     Problem: Nutrition  Goal: Patient's nutritional and fluid intake will be adequate or improve  Outcome: Progressing  Goal: Enteral nutrition will be maintained or improve  Outcome: Progressing  Goal: Enteral nutrition will be maintained or improve  Outcome: Progressing     Problem: Urinary Elimination  Goal: Establish and maintain regular urinary output  Outcome: Progressing     Problem: Bowel Elimination  Goal: Establish and maintain regular bowel function  Outcome: Progressing     Problem: Gastrointestinal Irritability  Goal: Nausea and vomiting will be absent or improve  Outcome: Progressing  Goal: Diarrhea will be absent or improved  Outcome: Progressing     Problem: Rectal Tube  Goal: Fecal output will be contained and skin will remain free from irritation  Outcome: Progressing     Problem: Mobility  Goal: Patient's capacity to carry out activities will improve  Outcome: Progressing     Problem: Self Care  Goal: Patient will have the ability to perform ADLs independently or with assistance (bathe, groom, dress, toilet and feed)  Outcome: Progressing     Problem: Infection - Standard  Goal: Patient will remain free from infection  Outcome: Progressing     Problem: Wound/ / Incision Healing  Goal: Patient's wound/surgical incision will decrease in size and heals properly  Outcome: Progressing     Problem: Pain - Standard  Goal: Alleviation of pain or a reduction in pain to the patient’s comfort goal  Outcome: Progressing     Problem: Knowledge Deficit - Standard  Goal: Patient and family/care givers will demonstrate understanding of plan of care, disease process/condition, diagnostic tests and medications  Outcome: Progressing       Patient is not progressing towards the following goals:       Was not done

## 2023-06-20 NOTE — OB RN PATIENT PROFILE - INSTRUCTED TO PATIENT: IF THE INFANT IS NOT PINK DURING SKIN TO SKIN, THE PARENTS IS TO SEEK ASSISTANCE IMMEDIATELY.
Spine appears normal, range of motion is not limited, left mid flank with contusion/ hematoma Statement Selected

## 2023-06-20 NOTE — DISCHARGE NOTE OB - MEDICATION SUMMARY - MEDICATIONS TO TAKE
I will START or STAY ON the medications listed below when I get home from the hospital:  None I will START or STAY ON the medications listed below when I get home from the hospital:    ibuprofen 600 mg oral tablet  -- 1 tab(s) by mouth every 6 hours as needed for  moderate pain  -- Indication: For Pain    acetaminophen 325 mg oral tablet  -- 3 tab(s) by mouth every 6 hours as needed for  moderate pain  -- Indication: For Pain

## 2023-06-20 NOTE — OB PROVIDER H&P - HISTORY OF PRESENT ILLNESS
28 year old female  @ 38.5GA with SLIUP, prior c/s for breech PTL , incompetant cervix s/p cerclage removal 23, presents to rule out ROM. reports gush of clear fluid at 4am, continuos. contractions q 4-5 minutes rated 7/10 in pain, considering epidural. reports GFM. denies VB, HA, N/V, fever, chills. desires TOLAC.  Rodriguez Cerclage placed 23 removed 23  Pt was admitted to L&D twice for labor and discharged after not making cervical change  - Admitted - received steroids  - admitted  6/10, discharged     PNC with Dr Menendez. last visit 6/15, 3.5cm dilated.   efw 8#  GBS neg

## 2023-06-20 NOTE — DISCHARGE NOTE OB - MATERIALS PROVIDED
Vaccinations/[facility]  Screening Program/  Immunization Record/Breastfeeding Log/Breastfeeding Mother’s Support Group Information/Guide to Postpartum Care/[facility] Hearing Screen Program/Back To Sleep Handout/Shaken Baby Prevention Handout/Breastfeeding Guide and Packet/Discharge Medication Information for Patients and Families Pocket Guide

## 2023-06-20 NOTE — DISCHARGE NOTE OB - PATIENT PORTAL LINK FT
You can access the FollowMyHealth Patient Portal offered by [facility] by registering at the following website: [email or web address]. By joining [facility]’s FollowMyHealth portal, you will also be able to view your health information using other applications (apps) compatible with our system.

## 2023-06-20 NOTE — OB PROVIDER H&P - NSHPPHYSICALEXAM_GEN_ALL_CORE
Vital Signs Last 24 Hrs  T(C): 36.9 (20 Jun 2023 06:10), Max: 36.9 (20 Jun 2023 06:10)  T(F): 98.4 (20 Jun 2023 06:10), Max: 98.4 (20 Jun 2023 06:10)  HR: 61 (20 Jun 2023 06:17) (61 - 61)  BP: 125/78 (20 Jun 2023 06:17) (125/78 - 125/78)  BP(mean): --  RR: 18 (20 Jun 2023 06:10) (18 - 18)  SpO2: --    gen: a/o x 3, in no apparent distress  pulm: breathing unlabored on room air  abd: soft and nontender, gravid  SVE: 4/80/-3. nitrazine/ferning positve.   TAS: vertex by sono.   EFM: baseline 135 with mod variability, pos accels- reactive   TOCO: irreg contractions - pt reports q 4-5 minutes

## 2023-06-20 NOTE — OB RN INTRAOPERATIVE NOTE - NSSELHIDDEN_OBGYN_ALL_OB_FT
[NS_DeliveryAttending1_OBGYN_ALL_OB_FT:[id number]],[NS_DeliveryAssist1_OBGYN_ALL_OB_FT:[id number]],[NS_DeliveryRN_OBGYN_ALL_OB_FT:[id number]]

## 2023-06-20 NOTE — DISCHARGE NOTE OB - CARE PROVIDER_API CALL
Melani Thomas  Obstetrics and Gynecology  [address] [place] [address]  Phone: [phone]  Follow Up Time:

## 2023-06-21 LAB
BASOPHILS # BLD AUTO: 0.02 K/UL — SIGNIFICANT CHANGE UP (ref 0–0.2)
BASOPHILS NFR BLD AUTO: 0.1 % — SIGNIFICANT CHANGE UP (ref 0–2)
EOSINOPHIL # BLD AUTO: 0 K/UL — SIGNIFICANT CHANGE UP (ref 0–0.5)
EOSINOPHIL NFR BLD AUTO: 0 % — SIGNIFICANT CHANGE UP (ref 0–6)
HCT VFR BLD CALC: 32.4 % — LOW (ref 34.5–45)
HGB BLD-MCNC: 10 G/DL — LOW (ref 11.5–15.5)
IANC: 11.43 K/UL — HIGH (ref 1.8–7.4)
IMM GRANULOCYTES NFR BLD AUTO: 0.5 % — SIGNIFICANT CHANGE UP (ref 0–0.9)
LYMPHOCYTES # BLD AUTO: 1.68 K/UL — SIGNIFICANT CHANGE UP (ref 1–3.3)
LYMPHOCYTES # BLD AUTO: 11.5 % — LOW (ref 13–44)
MCHC RBC-ENTMCNC: 22.3 PG — LOW (ref 27–34)
MCHC RBC-ENTMCNC: 30.9 GM/DL — LOW (ref 32–36)
MCV RBC AUTO: 72.3 FL — LOW (ref 80–100)
MONOCYTES # BLD AUTO: 1.39 K/UL — HIGH (ref 0–0.9)
MONOCYTES NFR BLD AUTO: 9.5 % — SIGNIFICANT CHANGE UP (ref 2–14)
NEUTROPHILS # BLD AUTO: 11.43 K/UL — HIGH (ref 1.8–7.4)
NEUTROPHILS NFR BLD AUTO: 78.4 % — HIGH (ref 43–77)
NRBC # BLD: 0 /100 WBCS — SIGNIFICANT CHANGE UP (ref 0–0)
NRBC # FLD: 0 K/UL — SIGNIFICANT CHANGE UP (ref 0–0)
PLATELET # BLD AUTO: 245 K/UL — SIGNIFICANT CHANGE UP (ref 150–400)
RBC # BLD: 4.48 M/UL — SIGNIFICANT CHANGE UP (ref 3.8–5.2)
RBC # FLD: 17 % — HIGH (ref 10.3–14.5)
WBC # BLD: 14.59 K/UL — HIGH (ref 3.8–10.5)
WBC # FLD AUTO: 14.59 K/UL — HIGH (ref 3.8–10.5)

## 2023-06-21 RX ORDER — METOCLOPRAMIDE HCL 10 MG
10 TABLET ORAL ONCE
Refills: 0 | Status: COMPLETED | OUTPATIENT
Start: 2023-06-21 | End: 2023-06-21

## 2023-06-21 RX ORDER — SENNA PLUS 8.6 MG/1
1 TABLET ORAL
Refills: 0 | Status: DISCONTINUED | OUTPATIENT
Start: 2023-06-21 | End: 2023-06-22

## 2023-06-21 RX ORDER — IBUPROFEN 200 MG
600 TABLET ORAL EVERY 6 HOURS
Refills: 0 | Status: DISCONTINUED | OUTPATIENT
Start: 2023-06-21 | End: 2023-06-22

## 2023-06-21 RX ORDER — FERROUS SULFATE 325(65) MG
325 TABLET ORAL DAILY
Refills: 0 | Status: DISCONTINUED | OUTPATIENT
Start: 2023-06-21 | End: 2023-06-22

## 2023-06-21 RX ADMIN — Medication 975 MILLIGRAM(S): at 20:23

## 2023-06-21 RX ADMIN — Medication 30 MILLIGRAM(S): at 00:50

## 2023-06-21 RX ADMIN — Medication 975 MILLIGRAM(S): at 14:00

## 2023-06-21 RX ADMIN — Medication 30 MILLIGRAM(S): at 00:20

## 2023-06-21 RX ADMIN — Medication 975 MILLIGRAM(S): at 13:11

## 2023-06-21 RX ADMIN — HEPARIN SODIUM 5000 UNIT(S): 5000 INJECTION INTRAVENOUS; SUBCUTANEOUS at 17:39

## 2023-06-21 RX ADMIN — Medication 10 MILLIGRAM(S): at 02:42

## 2023-06-21 RX ADMIN — Medication 975 MILLIGRAM(S): at 06:19

## 2023-06-21 RX ADMIN — ONDANSETRON 4 MILLIGRAM(S): 8 TABLET, FILM COATED ORAL at 03:14

## 2023-06-21 RX ADMIN — Medication 975 MILLIGRAM(S): at 07:00

## 2023-06-21 RX ADMIN — Medication 30 MILLIGRAM(S): at 12:30

## 2023-06-21 RX ADMIN — SENNA PLUS 1 TABLET(S): 8.6 TABLET ORAL at 12:05

## 2023-06-21 RX ADMIN — Medication 975 MILLIGRAM(S): at 21:23

## 2023-06-21 RX ADMIN — HEPARIN SODIUM 5000 UNIT(S): 5000 INJECTION INTRAVENOUS; SUBCUTANEOUS at 06:20

## 2023-06-21 RX ADMIN — Medication 600 MILLIGRAM(S): at 17:39

## 2023-06-21 RX ADMIN — SIMETHICONE 80 MILLIGRAM(S): 80 TABLET, CHEWABLE ORAL at 11:51

## 2023-06-21 RX ADMIN — Medication 30 MILLIGRAM(S): at 11:30

## 2023-06-21 NOTE — LACTATION INITIAL EVALUATION - LACTATION INTERVENTIONS
assisted to put baby to left breast in football hold position with deep latch and baby noted to suck vigorously/initiate/review safe skin-to-skin/initiate/review hand expression/initiate/review techniques for position and latch/initiate/review breast massage/compression/initiate/review alternate feeding method/reviewed importance of monitoring infant diapers, the breastfeeding log, and minimum output each day/reviewed risks of unnecessary formula supplementation/reviewed risks of artificial nipples/reviewed strategies to transition to breastfeeding only/reviewed benefits and recommendations for rooming in/reviewed feeding on demand/by cue at least 8 times a day/reviewed indications of inadequate milk transfer that would require supplementation

## 2023-06-21 NOTE — PROGRESS NOTE ADULT - ASSESSMENT
A/P: 27yo POD #1 s/p LTCS.  Patient is stable and doing well post-operatively.      - Continue regular diet.  - DTV at 12p  - Increase ambulation.  - PO pain medication with Tylenol, Motrin and Oxycodone PRN for pain control.    - POD #1 CBC this morning.   - DVT prophylaxis with Heparin 5000u BID    Amyeo Jereen PGY-2     A/P: 27yo POD #1 s/p LTCS.  Patient is stable and doing well post-operatively.      - Continue regular diet.  - DTV at 12p  - Increase ambulation.  - PO pain medication with Tylenol, Motrin and Oxycodone PRN for pain control.    - POD #1 CBC this morning reviewed and appropriate  - DVT prophylaxis with Heparin 5000u BID    Amyeo Jereen PGY-2

## 2023-06-22 VITALS
SYSTOLIC BLOOD PRESSURE: 101 MMHG | OXYGEN SATURATION: 99 % | TEMPERATURE: 98 F | HEART RATE: 65 BPM | RESPIRATION RATE: 18 BRPM | DIASTOLIC BLOOD PRESSURE: 59 MMHG

## 2023-06-22 RX ORDER — IBUPROFEN 200 MG
1 TABLET ORAL
Qty: 0 | Refills: 0 | DISCHARGE
Start: 2023-06-22

## 2023-06-22 RX ORDER — ACETAMINOPHEN 500 MG
3 TABLET ORAL
Qty: 0 | Refills: 0 | DISCHARGE
Start: 2023-06-22

## 2023-06-22 RX ADMIN — Medication 600 MILLIGRAM(S): at 00:51

## 2023-06-22 RX ADMIN — Medication 1 TABLET(S): at 11:52

## 2023-06-22 RX ADMIN — Medication 325 MILLIGRAM(S): at 11:53

## 2023-06-22 RX ADMIN — HEPARIN SODIUM 5000 UNIT(S): 5000 INJECTION INTRAVENOUS; SUBCUTANEOUS at 06:48

## 2023-06-22 RX ADMIN — Medication 975 MILLIGRAM(S): at 09:01

## 2023-06-22 RX ADMIN — Medication 600 MILLIGRAM(S): at 11:53

## 2023-06-22 RX ADMIN — Medication 975 MILLIGRAM(S): at 09:45

## 2023-06-22 RX ADMIN — SIMETHICONE 80 MILLIGRAM(S): 80 TABLET, CHEWABLE ORAL at 01:22

## 2023-06-22 RX ADMIN — Medication 975 MILLIGRAM(S): at 05:00

## 2023-06-22 RX ADMIN — Medication 975 MILLIGRAM(S): at 04:27

## 2023-06-22 RX ADMIN — Medication 600 MILLIGRAM(S): at 06:49

## 2023-06-22 RX ADMIN — Medication 600 MILLIGRAM(S): at 01:30

## 2023-06-22 RX ADMIN — Medication 600 MILLIGRAM(S): at 07:30

## 2023-06-22 RX ADMIN — Medication 600 MILLIGRAM(S): at 12:30

## 2023-06-22 NOTE — PROGRESS NOTE ADULT - ASSESSMENT
A/P: 27yo POD #2 s/p LTCS.  Patient is stable and doing well post-operatively.     Her pain is well controlled.   She is tolerating a regular diet and passing flatus.   Denies N/V. Denies CP/SOB/lightheadedness/dizziness.   She is ambulating without difficulty.   Voiding spontaneously.    Patient is stable and doing well post-operatively.    - Continue regular diet.  - Increase ambulation.  - Continue motrin, tylenol, oxycodone PRN for pain control.   -Encourage breastfeeding.  -Incisional care and PO instructions reviewed.     Follow up @ [facility] in 2 weeks for incision check visit  [phone]    Discussed with MD Magaly Perkins

## 2023-06-22 NOTE — PROGRESS NOTE ADULT - SUBJECTIVE AND OBJECTIVE BOX
Post-Operative Note, C/S  She is a  28y woman who is now post-operative day: 2    Subjective:  The patient feels well.  She is ambulating.   She is tolerating regular diet.  She denies nausea and vomiting; denies fever.  She is voiding.  Her pain is controlled; incisional pain is appropriate.  She reports normal postpartum bleeding.  She is breastfeeding.  She is formula feeding.    Physical exam:    Vital Signs Last 24 Hrs  T(C): 36.7 (22 Jun 2023 05:27), Max: 37.1 (21 Jun 2023 18:00)  T(F): 98.1 (22 Jun 2023 05:27), Max: 98.8 (21 Jun 2023 18:00)  HR: 65 (22 Jun 2023 05:27) (63 - 96)  BP: 101/59 (22 Jun 2023 05:27) (101/59 - 116/73)  BP(mean): --  RR: 18 (22 Jun 2023 05:27) (17 - 18)  SpO2: 99% (22 Jun 2023 05:27) (98% - 99%)    Parameters below as of 22 Jun 2023 05:27  Patient On (Oxygen Delivery Method): room air        Gen: NAD  Breast: Soft, nontender, not engorged.  Abdomen: Soft, nontender, no distension , firm uterine fundus at umbilicus.  Incision: C/D/I.  Pelvic: Normal lochia noted  Ext: No calf tenderness    LABS:                        10.0   14.59 )-----------( 245      ( 21 Jun 2023 06:05 )             32.4       Rubella status:     Allergies    morphine (Urticaria)    Intolerances      MEDICATIONS  (STANDING):  acetaminophen     Tablet .. 975 milliGRAM(s) Oral <User Schedule>  diphtheria/tetanus/pertussis (acellular) Vaccine (Adacel) 0.5 milliLiter(s) IntraMuscular once  ferrous    sulfate 325 milliGRAM(s) Oral daily  heparin   Injectable 5000 Unit(s) SubCutaneous every 12 hours  ibuprofen  Tablet. 600 milliGRAM(s) Oral every 6 hours  prenatal multivitamin 1 Tablet(s) Oral daily    MEDICATIONS  (PRN):  diphenhydrAMINE 25 milliGRAM(s) Oral every 6 hours PRN Pruritus  magnesium hydroxide Suspension 30 milliLiter(s) Oral two times a day PRN Constipation  oxyCODONE    IR 5 milliGRAM(s) Oral once PRN Moderate to Severe Pain (4-10)  oxyCODONE    IR 5 milliGRAM(s) Oral every 3 hours PRN Moderate to Severe Pain (4-10)  senna 1 Tablet(s) Oral two times a day PRN Constipation  simethicone 80 milliGRAM(s) Chew every 4 hours PRN Gas            
OB Postpartum Note:  Delivery    S: 27yo now POD #1 s/p LTCS. Her pain is well controlled. She is tolerating a regular diet but has not yet passed flatus. Ambulating without difficulty. Denies N/V. Denies CP/SOB/lightheadedness/dizziness.     O:   Vitals:  Vital Signs Last 24 Hrs  T(C): 36.9 (2023 06:10), Max: 36.9 (2023 06:10)  T(F): 98.4 (2023 06:10), Max: 98.4 (2023 06:10)  HR: 72 (2023 06:10) (59 - 87)  BP: 112/59 (2023 06:10) (102/58 - 131/73)  BP(mean): 85 (2023 14:00) (77 - 94)  RR: 18 (2023 06:10) (15 - 23)  SpO2: 100% (2023 06:10) (91% - 100%)    Parameters below as of 2023 20:55  Patient On (Oxygen Delivery Method): room air        MEDICATIONS  (STANDING):  acetaminophen     Tablet .. 975 milliGRAM(s) Oral <User Schedule>  diphtheria/tetanus/pertussis (acellular) Vaccine (Adacel) 0.5 milliLiter(s) IntraMuscular once  heparin   Injectable 5000 Unit(s) SubCutaneous every 12 hours  ibuprofen  Tablet. 600 milliGRAM(s) Oral every 6 hours  ketorolac   Injectable 30 milliGRAM(s) IV Push every 6 hours  lactated ringers. 1000 milliLiter(s) (75 mL/Hr) IV Continuous <Continuous>  lactated ringers. 1000 milliLiter(s) (125 mL/Hr) IV Continuous <Continuous>  oxytocin Infusion 333.333 milliUNIT(s)/Min (1000 mL/Hr) IV Continuous <Continuous>    MEDICATIONS  (PRN):  butorphanol Injectable 0.25 milliGRAM(s) IV Push every 6 hours PRN Pruritus  dexAMETHasone  Injectable 4 milliGRAM(s) IV Push every 6 hours PRN Nausea  diphenhydrAMINE 25 milliGRAM(s) Oral every 6 hours PRN Pruritus  lanolin Ointment 1 Application(s) Topical every 6 hours PRN Sore Nipples  magnesium hydroxide Suspension 30 milliLiter(s) Oral two times a day PRN Constipation  naloxone Injectable 0.1 milliGRAM(s) IV Push every 3 minutes PRN For ANY of the following changes in patient status:  A. Breaths Per Minute LESS THAN 10, B. Oxygen saturation LESS THAN 90%, C. Sedation score of 6 for Stop After: 4 Times  ondansetron Injectable 4 milliGRAM(s) IV Push every 6 hours PRN Nausea  oxyCODONE    IR 10 milliGRAM(s) Oral every 3 hours PRN Moderate Pain (4 - 6)  oxyCODONE    IR 5 milliGRAM(s) Oral every 3 hours PRN Moderate to Severe Pain (4-10)  oxyCODONE    IR 5 milliGRAM(s) Oral once PRN Moderate to Severe Pain (4-10)  oxyCODONE    IR 5 milliGRAM(s) Oral every 3 hours PRN Mild Pain (1 - 3)  simethicone 80 milliGRAM(s) Chew every 4 hours PRN Gas      Labs:  Blood type: A Positive  Rubella IgG: RPR: Negative                          10.0<L>   14.59<H> >-----------< 245    (  @ 06:05 )             32.4<L>                        10.4<L>   7.94 >-----------< 266    (  @ 06:40 )             33.5<L>                  PE:  General: NAD, patient resting comfortably in bed  Abdomen: Mildly distended, appropriately tender  Incision: Clean, dry, intact.  Extremities: SCDs in place, no erythema

## 2023-06-29 ENCOUNTER — APPOINTMENT (OUTPATIENT)
Age: 28
End: 2023-06-29
Payer: COMMERCIAL

## 2023-06-29 PROCEDURE — S9443: CPT | Mod: 95

## 2023-06-30 ENCOUNTER — APPOINTMENT (OUTPATIENT)
Age: 28
End: 2023-06-30

## 2023-09-08 NOTE — OB RN PATIENT PROFILE - NS PRO DEPRESSION SCREENING Y/N6
[No Acute Distress] : no acute distress [Normal Oropharynx] : normal oropharynx [Normal Appearance] : normal appearance [No Neck Mass] : no neck mass [Normal Rate/Rhythm] : normal rate/rhythm [Normal S1, S2] : normal s1, s2 [No Murmurs] : no murmurs [No Resp Distress] : no resp distress [Clear to Auscultation Bilaterally] : clear to auscultation bilaterally [No Abnormalities] : no abnormalities [Benign] : benign [Normal Gait] : normal gait [No Clubbing] : no clubbing [No Cyanosis] : no cyanosis [No Edema] : no edema [FROM] : FROM [Normal Color/ Pigmentation] : normal color/ pigmentation [No Focal Deficits] : no focal deficits [Oriented x3] : oriented x3 [Normal Affect] : normal affect no

## 2023-12-04 NOTE — DISCHARGE NOTE OB - HAVE YOU RECEIVED AT LEAST TWO PFIZER AND/OR MODERNA VACCINATIONS (IN ANY COMBINATION) AND/OR ONE JOHNSON & JOHNSON VACCINATION?
Physical Therapy Treatment    Patient Name: Romie Iyer  MRN: [id number]  Today's Date: 12/4/2023  Time Calculation  Start Time: 1745  Stop Time: 1830  Time Calculation (min): 45 min    Current Problem:  Problem List Items Addressed This Visit             ICD-10-CM    Pain R52    Balance problem R26.89         Subjective   General:   L hip continues to be sore, taking longer than expected.    No knee pain.   HEP is going well.   Pain:  Pain Assessment: 0-10  Pain Score: 0 - No pain  Pain Location: Hip  Pain Orientation: Left    Precautions:  Precautions  Precautions Comment: hx of falls, neuropathy, short term memory loss    Objective   No objective measures taken this visit    Treatment:  Therapeutic exercise  Nustep L4 x 5 min- Does not like scifit   Seated BL HSS x 1 min  Seated BL piriformis stretch x 1 min  Seated marches x 1 min  Sit to stand from elevated plinth 2x10   HR/TR 2x10   Step up fwd BL 4'' x10  Reverse step up/down 4'' x5  LAQ 1# 2x10  Monster walk 20 ft x 2 CGA , gait belt donned     Neuromuscular Re-education   L AE step up x10 , UE support   AE NBOS x 1 min       Manual       Modalities      Assessment   Pt very fatigued with exercises, requiring seated rest breaks throughout session.  Very challenged with AE activities, requiring encouragement to decrease UE support. No complaints of hip pain with exercises or post tx.     Plan    Continue to progress POC as tolerated by patient to improve strength, mobility and overall function    Goals:  Active       PT Problem       Reduce pain at worst to 2/10 with all functional and recreational activity.        Start:  11/22/23            Increase by > or = 1/2 mm grade to improve stepping up on step at home, perform transfers without increased pain/compensation         Start:  11/22/23            Increase ROM/flexibility to WFL to perform daily functional activities including transfers, stair negotiation       Start:  11/22/23            Tinetti score >  24 pts to display reduced and low risk for falls.        Start:  11/22/23            Patient will demonstrate independence (with assist from family) in home program for support of progression        Start:  11/22/23                No

## 2023-12-06 ENCOUNTER — APPOINTMENT (OUTPATIENT)
Dept: ORTHOPEDIC SURGERY | Facility: CLINIC | Age: 28
End: 2023-12-06

## 2024-01-17 NOTE — OB RN TRIAGE NOTE - NS_DISPOSITION_OBGYN_ALL_OB
normal appearance , without tenderness upon palpation , no deformities , trachea midline, no masses , no JVD. Continue to Observe Admit to Labor and Delivery

## 2024-02-26 NOTE — PROGRESS NOTE ADULT - SUBJECTIVE AND OBJECTIVE BOX
Patient seen and examined at the bedside.  No acute events overnight.  No new complaints. Denies N/V/D, CP, SOB, Headache or dizziness.    Patient had spiked fever   fever POD 2 100.6  down trending, pending eval today (POD 3) possible d/c home vs w/u    patient offers she is feeling better pain improving, tolerating diet          HPI:  23y/o female with no significant PMH present with c/o RLQ pain. Pt states that she started her menses on Wednesday and had cramps, however she began to have severe sharp RLQ pain yesterday evening, non-radiating Pain was associated with nausea, vomiting X 3 episodes and loss of appetite. Denies fever/chills, denies diarrhea, dysuria or frequency. Denies CP/SOB. Last meal was 6pm yesterday, last BM was yesterday morning, denies any change in bowel habits. (23 Jun 2017 14:03)      Vital Signs Last 24 Hrs  T(C): 36.7, Max: 38.1 (06-25 @ 17:07)  T(F): 98.1, Max: 100.6 (06-25 @ 17:07)  HR: 59 (59 - 92)  BP: 105/69 (105/69 - 128/73)  BP(mean): --  RR: 16 (15 - 16)  SpO2: 98% (95% - 100%)                          10.3   4.1   )-----------( 197      ( 25 Jun 2017 06:33 )             30.3       06-24    141  |  107  |  6<L>  ----------------------------<  83  3.6   |  28  |  0.96    Ca    7.9<L>      24 Jun 2017 19:30      I&O's Detail    I & Os for current day (as of 26 Jun 2017 06:46)  =============================================  IN:    lactated ringers.: 750 ml    Oral Fluid: 380 ml    Solution: 100 ml    Total IN: 1230 ml  ---------------------------------------------  OUT:    Total OUT: 0 ml  ---------------------------------------------  Total NET: 1230 ml      MEDICATIONS  (STANDING):  heparin  Injectable 5000Unit(s) SubCutaneous every 12 hours  docusate sodium 100milliGRAM(s) Oral two times a day    MEDICATIONS  (PRN):  oxyCODONE  5 mG/acetaminophen 325 mG 1Tablet(s) Oral every 4 hours PRN Mild Pain (1 - 3)  ondansetron Injectable 8milliGRAM(s) IV Push every 6 hours PRN Nausea and/or Vomiting  oxyCODONE  5 mG/acetaminophen 325 mG 2Tablet(s) Oral every 4 hours PRN Severe Pain (7 - 10)  acetaminophen   Tablet 650milliGRAM(s) Oral every 6 hours PRN For Temp greater than 38 C (100.4 F)        Physical Exam  GEN: awake alert NAD  HEENT: NCAT, Trachea midline, no scleral icterus  Resp: unlabored, No SOB, CTAB  CV: No Tachycardia, S1 S2  ABD: Soft, ND with expected surgical site tenderness steri-strips intact no evidence of new bleeding  : voiding no issues  EXT: warm well perfused, no edema, no calf tenderness    A/P: 22yFemale a/w appendicitis S/P Laparoscopic appendectomy POD#3, fever improving, tolerating diet, pain controlled.    -f/u AM labs,   -OOB  -encourage I/S  -f/u w/ attg re: plan for likely d/c home later today pending fevers        PAST MEDICAL & SURGICAL HISTORY:  No pertinent past medical history  No significant past surgical history      - Fracture of proximal end of right femur

## 2024-03-25 NOTE — ED ADULT TRIAGE NOTE - PATIENT ON (OXYGEN DELIVERY METHOD)
Patient attempted on SBT. Patient had no spontaneous respirations. Vent in backup mode.  Placed back on full support settings.    room air

## 2024-04-12 NOTE — OB PROVIDER H&P - NSICDXFAMILYHX_GEN_ALL_CORE_FT
[facility]
FAMILY HISTORY:  No pertinent family history in first degree relatives

## 2024-06-06 NOTE — BRIEF OPERATIVE NOTE - ANTIBIOTIC PROTOCOL
Chief Complaint   Patient presents with    Office Visit    Follow-up     6-week DNE f/u.       SUBJECTIVE:    Patient ID: Rubén Watson is a 32 year old  female.        Pt here post 24 week termination due to microcephaly.  Pt doing well physically.  First period happened last week.  Continues to go to therapy weekly.  Back to work.  Waiting for microarray results. Once pt has those results, recommend pt and  see MFM and genetics to discuss future pregnancies and risks.  Pt interested in birth control at this time.  Used NuvaRing in past-script sent.   Patient's last menstrual period was 2024 (approximate).  HPI                   .  Family History   Problem Relation Age of Onset    Patient is unaware of any medical problems Mother     Hypertension Father     Heart disease Father     Bipolar disorder Sister         type 2    Diabetes Maternal Aunt         type 2    Cancer, Breast Maternal Aunt 40    Hyperlipidemia Maternal Grandmother     Hypertension Maternal Grandmother     Hypertension Paternal Grandmother     Stroke Paternal Grandmother     Heart disease Paternal Grandmother     Hypertension Maternal Grandfather     Hyperlipidemia Maternal Grandfather     Heart disease Paternal Grandfather     Hyperlipidemia Paternal Grandfather     Hypertension Paternal Grandfather     Stroke Paternal Grandfather      Social History     Tobacco Use    Smoking status: Never     Passive exposure: Never    Smokeless tobacco: Never   Vaping Use    Vaping status: never used   Substance Use Topics    Alcohol use: Not Currently    Drug use: Never     ALLERGIES:  Casirivimab & imdevimab  Past Surgical History:   Procedure Laterality Date    Oral surgery procedure        Current Outpatient Medications   Medication Sig Dispense Refill    doxepin 3 MG tablet Take 3 mg by mouth at bedtime.      Prenatal Vit-Fe Fumarate-FA (multivitamin & mineral w/folic acid- PRENATAL) 27-0.8 MG tablet [None received]       No current  facility-administered medications for this visit.     Past Medical History:   Diagnosis Date    ADHD     Anxiety     History of asthma     Has a child    History of HPV infection       OB History    Para Term  AB Living   1 1 0 1 0 0   SAB IAB Ectopic Molar Multiple Live Births   0 0 0 0 0 0        ROS  Constitutional:  Denies headache.  No weight changes.  No fevers or chills.    HEENT:  Denies vision changes or hearing changes. No sinus problems.  Breasts:  Denies breast masses, pain or nipple discharge.    Respiratory:  No breathing issues, cough or shortness of breath.  Cardiovascular:  Denies chest pain, syncope or palpitations.    Gastrointestinal:  Denies nausea, vomiting, diarrhea, or constipation.  Endocrine:  Denies hot flashes, night sweats, heat or cold intolerance.  Hematologic:  Denies easy bruising or bleeding disorders.  Allergies/Immunologic:  Denies seasonal allergies or any history of immunologic disorders.  Neurologic:  Normal sensation and motor control.  No history of seizures or syncope.  Musculoskeletal:  Denies joint pain, swelling, or erythema.  Skin:  Denies rashes, significant lesions or pruritus.  Psychiatric:  Denies anxiety, depression, memory deficits, and appetite or sleep changes.    Physical Exam:    Visit Vitals  /81 (BP Location: RUE - Right upper extremity, Patient Position: Sitting, Cuff Size: Large Adult)   Pulse 76   Resp 16   Ht 5' 7.13\" (1.705 m)   Wt 104.8 kg (231 lb)   LMP 2024 (Approximate)   SpO2 96%   BMI 36.04 kg/m²     General exam: Patient is a [facility] AF in [facility].  A/Ox3     ASSESSMENT:    Problem List Items Addressed This Visit    None  Visit Diagnoses       History of medical termination of pregnancy    -  Primary    secondary to microcephaly           No results found for this visit on 24.   No orders of the defined types were placed in this encounter.       PLAN:     -Refer to Orders   -Patient encouraged to maintain healthy  lifestyle, diet and exercise   --Patient to follow up with PCP for annual exam and screenings.   -Safe sex practices discussed   -All questions answered     Return if symptoms worsen or fail to improve.    Reny Pozo, MARIA ELENAM   Followed protocol

## 2024-07-15 ENCOUNTER — NON-APPOINTMENT (OUTPATIENT)
Age: 29
End: 2024-07-15

## 2024-07-23 NOTE — OB RN PATIENT PROFILE - BLOOD TRANSFUSION, PREVIOUS, PROFILE
Problem: At Risk for Falls  Goal: Patient does not fall  Outcome: Monitoring/Evaluating progress     Problem: Skin Integrity Alteration  Goal: Skin remains intact with no new/deterioration of wound or pressure injury  Outcome: Monitoring/Evaluating progress     Problem: Skin Integrity Alteration  Goal: Comfort optimized with pressure injury prevention strategies guided by patient/family preference. (Hospice)  Outcome: Monitoring/Evaluating progress      no

## 2024-09-13 NOTE — H&P PST ADULT - EYES
Sent message to patient to let him know    ----- Message from Ana Lilia Claros MD sent at 9/13/2024  8:44 AM CDT -----  The Labs are stable; TG are a bit up- to see pcp re treatment. Start rapa 2 mg daily and repeat labs in one week- please let patient know.   EOMI; PERRL; no drainage or redness

## 2024-11-22 ENCOUNTER — APPOINTMENT (OUTPATIENT)
Dept: INTERNAL MEDICINE | Facility: CLINIC | Age: 29
End: 2024-11-22

## 2024-11-22 ENCOUNTER — OUTPATIENT (OUTPATIENT)
Dept: OUTPATIENT SERVICES | Facility: HOSPITAL | Age: 29
LOS: 1 days | End: 2024-11-22

## 2024-11-22 ENCOUNTER — NON-APPOINTMENT (OUTPATIENT)
Age: 29
End: 2024-11-22

## 2024-11-22 ENCOUNTER — TRANSCRIPTION ENCOUNTER (OUTPATIENT)
Age: 29
End: 2024-11-22

## 2024-11-22 DIAGNOSIS — Z90.49 ACQUIRED ABSENCE OF OTHER SPECIFIED PARTS OF DIGESTIVE TRACT: Chronic | ICD-10-CM

## 2024-11-22 DIAGNOSIS — Z98.890 OTHER SPECIFIED POSTPROCEDURAL STATES: Chronic | ICD-10-CM

## 2024-11-22 DIAGNOSIS — Z98.891 HISTORY OF UTERINE SCAR FROM PREVIOUS SURGERY: Chronic | ICD-10-CM

## 2024-11-25 ENCOUNTER — TRANSCRIPTION ENCOUNTER (OUTPATIENT)
Age: 29
End: 2024-11-25

## 2024-12-02 ENCOUNTER — TRANSCRIPTION ENCOUNTER (OUTPATIENT)
Age: 29
End: 2024-12-02

## 2024-12-06 ENCOUNTER — APPOINTMENT (OUTPATIENT)
Dept: ORTHOPEDIC SURGERY | Facility: CLINIC | Age: 29
End: 2024-12-06

## 2025-01-21 NOTE — OB PST NOTE - HEIGHT IN INCHES
Called patient to reschedule 2/21/2025 MARCELO Bhardwaj appointment. LVM to call and reschedule.    6

## 2025-03-25 ENCOUNTER — NON-APPOINTMENT (OUTPATIENT)
Age: 30
End: 2025-03-25

## 2025-05-20 ENCOUNTER — APPOINTMENT (OUTPATIENT)
Dept: HEMATOLOGY ONCOLOGY | Facility: CLINIC | Age: 30
End: 2025-05-20

## 2025-05-20 ENCOUNTER — TRANSCRIPTION ENCOUNTER (OUTPATIENT)
Age: 30
End: 2025-05-20

## 2025-07-17 ENCOUNTER — APPOINTMENT (OUTPATIENT)
Dept: INTERNAL MEDICINE | Facility: CLINIC | Age: 30
End: 2025-07-17
Payer: COMMERCIAL

## 2025-07-17 VITALS
DIASTOLIC BLOOD PRESSURE: 70 MMHG | HEART RATE: 81 BPM | BODY MASS INDEX: 27.32 KG/M2 | WEIGHT: 164 LBS | HEIGHT: 65 IN | OXYGEN SATURATION: 96 % | SYSTOLIC BLOOD PRESSURE: 110 MMHG

## 2025-07-17 PROBLEM — Z87.19 HISTORY OF GASTROESOPHAGEAL REFLUX (GERD): Status: RESOLVED | Noted: 2017-07-07 | Resolved: 2025-07-17

## 2025-07-17 PROBLEM — Z82.49 FAMILY HISTORY OF HYPERTENSION: Status: ACTIVE | Noted: 2025-07-17

## 2025-07-17 PROBLEM — Z09 STATUS POST APPENDECTOMY, FOLLOW-UP EXAM: Status: RESOLVED | Noted: 2017-07-07 | Resolved: 2025-07-17

## 2025-07-17 PROBLEM — Z09 POSTOPERATIVE EXAMINATION: Status: RESOLVED | Noted: 2017-07-07 | Resolved: 2025-07-17

## 2025-07-17 PROBLEM — Z13.0 SCREENING, ANEMIA, DEFICIENCY, IRON: Status: ACTIVE | Noted: 2025-07-17

## 2025-07-17 PROBLEM — K35.30 ACUTE APPENDICITIS WITH LOCALIZED PERITONITIS: Status: RESOLVED | Noted: 2017-07-07 | Resolved: 2025-07-17

## 2025-07-17 PROBLEM — Z13.29 SCREENING FOR THYROID DISORDER: Status: ACTIVE | Noted: 2025-07-17

## 2025-07-17 PROBLEM — Z13.1 SCREENING FOR DIABETES MELLITUS: Status: ACTIVE | Noted: 2025-07-17

## 2025-07-17 PROBLEM — Z13.220 SCREENING FOR LIPID DISORDERS: Status: ACTIVE | Noted: 2025-07-17

## 2025-07-17 PROBLEM — D17.1 LIPOMA OF TORSO: Status: ACTIVE | Noted: 2025-07-17

## 2025-07-17 PROBLEM — O35.10X0 ANEUPLOIDY IN FETUS AFFECTING MANAGEMENT OF MOTHER: Status: RESOLVED | Noted: 2018-10-31 | Resolved: 2025-07-17

## 2025-07-17 PROCEDURE — 99385 PREV VISIT NEW AGE 18-39: CPT

## 2025-07-17 PROCEDURE — 36415 COLL VENOUS BLD VENIPUNCTURE: CPT

## 2025-07-17 RX ORDER — TIRZEPATIDE 7.5 MG/.5ML
7.5 INJECTION, SOLUTION SUBCUTANEOUS
Refills: 0 | Status: ACTIVE | COMMUNITY

## 2025-07-23 LAB
ALBUMIN SERPL ELPH-MCNC: 4.8 G/DL
ALP BLD-CCNC: 46 U/L
ALT SERPL-CCNC: 10 U/L
ANION GAP SERPL CALC-SCNC: 20 MMOL/L
AST SERPL-CCNC: 23 U/L
BASOPHILS # BLD AUTO: 0.05 K/UL
BASOPHILS NFR BLD AUTO: 0.8 %
BILIRUB SERPL-MCNC: 0.5 MG/DL
BUN SERPL-MCNC: 19 MG/DL
CALCIUM SERPL-MCNC: 9.2 MG/DL
CHLORIDE SERPL-SCNC: 102 MMOL/L
CHOLEST SERPL-MCNC: 273 MG/DL
CO2 SERPL-SCNC: 16 MMOL/L
CREAT SERPL-MCNC: 1.64 MG/DL
EGFRCR SERPLBLD CKD-EPI 2021: 43 ML/MIN/1.73M2
EOSINOPHIL # BLD AUTO: 0.11 K/UL
EOSINOPHIL NFR BLD AUTO: 1.8 %
ESTIMATED AVERAGE GLUCOSE: 100 MG/DL
GLUCOSE SERPL-MCNC: 168 MG/DL
HBA1C MFR BLD HPLC: 5.1 %
HCT VFR BLD CALC: 42.6 %
HDLC SERPL-MCNC: 63 MG/DL
HGB BLD-MCNC: 12.8 G/DL
IMM GRANULOCYTES NFR BLD AUTO: 0.3 %
LDLC SERPL-MCNC: 196 MG/DL
LYMPHOCYTES # BLD AUTO: 2.1 K/UL
LYMPHOCYTES NFR BLD AUTO: 33.9 %
MAN DIFF?: NORMAL
MCHC RBC-ENTMCNC: 24.9 PG
MCHC RBC-ENTMCNC: 30 G/DL
MCV RBC AUTO: 82.7 FL
MONOCYTES # BLD AUTO: 0.4 K/UL
MONOCYTES NFR BLD AUTO: 6.5 %
NEUTROPHILS # BLD AUTO: 3.51 K/UL
NEUTROPHILS NFR BLD AUTO: 56.7 %
NONHDLC SERPL-MCNC: 210 MG/DL
PLATELET # BLD AUTO: 359 K/UL
POTASSIUM SERPL-SCNC: 4.6 MMOL/L
PROT SERPL-MCNC: 7.6 G/DL
RBC # BLD: 5.15 M/UL
RBC # FLD: 18.6 %
SODIUM SERPL-SCNC: 138 MMOL/L
TRIGL SERPL-MCNC: 83 MG/DL
TSH SERPL-ACNC: 3.34 UIU/ML
WBC # FLD AUTO: 6.19 K/UL

## 2025-07-24 LAB
APPEARANCE: CLEAR
BACTERIA: NEGATIVE /HPF
BILIRUBIN URINE: NEGATIVE
BLOOD URINE: NEGATIVE
COLOR: YELLOW
GLUCOSE QUALITATIVE U: NEGATIVE MG/DL
KETONES URINE: NEGATIVE MG/DL
LEUKOCYTE ESTERASE URINE: NEGATIVE
MICROSCOPIC-UA: NORMAL
NITRITE URINE: NEGATIVE
PH URINE: 6
PROTEIN URINE: NEGATIVE MG/DL
RED BLOOD CELLS URINE: 0 /HPF
SPECIFIC GRAVITY URINE: 1.02
SQUAMOUS EPITHELIAL CELLS: 5
UROBILINOGEN URINE: 0.2 MG/DL
WHITE BLOOD CELLS URINE: 1 /HPF

## 2025-07-29 ENCOUNTER — NON-APPOINTMENT (OUTPATIENT)
Age: 30
End: 2025-07-29

## 2025-07-29 DIAGNOSIS — R79.89 OTHER SPECIFIED ABNORMAL FINDINGS OF BLOOD CHEMISTRY: ICD-10-CM

## (undated) DEVICE — DRAPE TOWEL BLUE 17" X 24"

## (undated) DEVICE — DRSG 4 X 4" 4PLY STERILE

## (undated) DEVICE — SOL IRR POUR NS 0.9% 1000ML

## (undated) DEVICE — VENODYNE/SCD SLEEVE CALF MEDIUM

## (undated) DEVICE — SUT VICRYL PLUS 4-0 18" PS-2 UNDYED

## (undated) DEVICE — SUT SILK 3-0 30" SH

## (undated) DEVICE — SUT PROLENE 2-0 30" CT-2

## (undated) DEVICE — PACK MINOR WITH LAP

## (undated) DEVICE — WARMING BLANKET UPPER ADULT

## (undated) DEVICE — DRSG TAPE MEDIPORE 3"

## (undated) DEVICE — GLV 7.5 PROTEXIS (WHITE)

## (undated) DEVICE — DRSG DERMABOND PRINEO 42CM

## (undated) DEVICE — DRAPE 3/4 SHEET W REINFORCEMENT 56X77"

## (undated) DEVICE — ELCTR REM POLYHESIVE PATIENT RETURN ELECTRODE ADULT